# Patient Record
Sex: MALE | Race: WHITE | NOT HISPANIC OR LATINO | Employment: FULL TIME | URBAN - NONMETROPOLITAN AREA
[De-identification: names, ages, dates, MRNs, and addresses within clinical notes are randomized per-mention and may not be internally consistent; named-entity substitution may affect disease eponyms.]

---
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
## 2021-05-10 ENCOUNTER — HOSPITAL ENCOUNTER (EMERGENCY)
Facility: HOSPITAL | Age: 21
Discharge: HOME | End: 2021-05-11
Attending: EMERGENCY MEDICINE
Payer: COMMERCIAL

## 2021-05-10 DIAGNOSIS — K04.7 DENTAL ABSCESS: Primary | ICD-10-CM

## 2021-05-10 PROCEDURE — 99285 EMERGENCY DEPT VISIT HI MDM: CPT | Performed by: EMERGENCY MEDICINE

## 2021-05-10 PROCEDURE — 99282 EMERGENCY DEPT VISIT SF MDM: CPT

## 2021-05-10 RX ORDER — PENICILLIN V POTASSIUM 500 MG/1
500 TABLET ORAL 4 TIMES DAILY
Qty: 40 TABLET | Refills: 0 | Status: SHIPPED | OUTPATIENT
Start: 2021-05-10 | End: 2021-05-20

## 2021-05-10 RX ORDER — OXYCODONE HYDROCHLORIDE AND ACETAMINOPHEN 5; 325 MG/1; MG/1
1 TABLET ORAL ONCE
Status: COMPLETED | OUTPATIENT
Start: 2021-05-11 | End: 2021-05-10

## 2021-05-10 RX ORDER — ONDANSETRON 4 MG/1
4 TABLET, ORALLY DISINTEGRATING ORAL ONCE
Status: COMPLETED | OUTPATIENT
Start: 2021-05-11 | End: 2021-05-10

## 2021-05-10 RX ORDER — NAPROXEN 500 MG/1
500 TABLET ORAL ONCE
Status: COMPLETED | OUTPATIENT
Start: 2021-05-11 | End: 2021-05-10

## 2021-05-10 RX ORDER — NAPROXEN 500 MG/1
500 TABLET ORAL 2 TIMES DAILY WITH MEALS
Qty: 10 TABLET | Refills: 0 | Status: SHIPPED | OUTPATIENT
Start: 2021-05-10 | End: 2021-05-29 | Stop reason: HOSPADM

## 2021-05-10 RX ORDER — PENICILLIN V POTASSIUM 250 MG/1
500 TABLET ORAL ONCE
Status: COMPLETED | OUTPATIENT
Start: 2021-05-11 | End: 2021-05-10

## 2021-05-10 RX ADMIN — OXYCODONE HYDROCHLORIDE AND ACETAMINOPHEN 1 TABLET: 5; 325 TABLET ORAL at 23:58

## 2021-05-10 RX ADMIN — ONDANSETRON 4 MG: 4 TABLET, ORALLY DISINTEGRATING ORAL at 23:58

## 2021-05-10 RX ADMIN — NAPROXEN 500 MG: 500 TABLET ORAL at 23:57

## 2021-05-10 RX ADMIN — PENICILLIN V POTASSIUM 500 MG: 250 TABLET, FILM COATED ORAL at 23:58

## 2021-05-10 NOTE — Clinical Note
Media Pod was seen and treated in our emergency department on 5/10/2021  Diagnosis:     Hailey Montgomery  may return to work on return date  He may return on this date: 05/12/2021         If you have any questions or concerns, please don't hesitate to call        Evelyn Bridges DO    ______________________________           _______________          _______________  Hospital Representative                              Date                                Time

## 2021-05-11 VITALS
HEART RATE: 72 BPM | BODY MASS INDEX: 30.83 KG/M2 | WEIGHT: 220.24 LBS | TEMPERATURE: 98.8 F | RESPIRATION RATE: 18 BRPM | HEIGHT: 71 IN | SYSTOLIC BLOOD PRESSURE: 127 MMHG | OXYGEN SATURATION: 98 % | DIASTOLIC BLOOD PRESSURE: 66 MMHG

## 2021-05-11 NOTE — ED PROVIDER NOTES
History  Chief Complaint   Patient presents with    Dental Pain     started with dental pain friday, lower rigth teeth  Did have tylenol 2hrs ago with positive results  HPI     Pt presents from home, hx of poor dentition and is scheduled to have 8 teeth "pulled out," c/o right, lower 3rd molar pain and swelling to his mandible which began yesterday  Pt states that he took tylenol tonight with little relief  Pt denies ha, fevers, cough, cp, sob, n/v/d/c, abd pain, dysuria, focal def or syncope  Prior to Admission Medications   Prescriptions Last Dose Informant Patient Reported? Taking?   naproxen (NAPROSYN) 500 mg tablet   No No   Sig: Take 1 tablet (500 mg total) by mouth 2 (two) times a day with meals      Facility-Administered Medications: None       Past Medical History:   Diagnosis Date    Asthma        History reviewed  No pertinent surgical history  History reviewed  No pertinent family history  I have reviewed and agree with the history as documented  E-Cigarette/Vaping    E-Cigarette Use Never User      E-Cigarette/Vaping Substances    Nicotine No     THC No     CBD No     Flavoring No     Other No     Unknown No      Social History     Tobacco Use    Smoking status: Never Smoker    Smokeless tobacco: Never Used   Substance Use Topics    Alcohol use: Never     Frequency: Never    Drug use: Never       Review of Systems   Constitutional: Negative for activity change, appetite change and fever  HENT: Positive for dental problem and facial swelling  Negative for congestion, nosebleeds and sore throat  Eyes: Negative for photophobia and discharge  Respiratory: Negative for cough, shortness of breath, wheezing and stridor  Cardiovascular: Negative for chest pain  Gastrointestinal: Negative for abdominal pain, constipation, diarrhea, nausea and vomiting  Endocrine: Negative for cold intolerance and polydipsia     Genitourinary: Negative for discharge, hematuria and penile pain  Musculoskeletal: Negative for arthralgias, myalgias and neck stiffness  Skin: Negative for color change and rash  Allergic/Immunologic: Negative for immunocompromised state  Neurological: Negative for dizziness, seizures and headaches  Hematological: Negative for adenopathy  Psychiatric/Behavioral: Negative for confusion and self-injury  The patient is not nervous/anxious  Physical Exam  Physical Exam  Vitals signs and nursing note reviewed  Constitutional:       General: He is not in acute distress  Appearance: He is well-developed  He is not diaphoretic  HENT:      Head: Normocephalic and atraumatic  Right Ear: External ear normal       Left Ear: External ear normal       Nose: Nose normal       Mouth/Throat:      Pharynx: No oropharyngeal exudate  Eyes:      General: No scleral icterus  Right eye: No discharge  Left eye: No discharge  Conjunctiva/sclera: Conjunctivae normal       Pupils: Pupils are equal, round, and reactive to light  Neck:      Musculoskeletal: Normal range of motion and neck supple  Thyroid: No thyromegaly  Vascular: No JVD  Trachea: No tracheal deviation  Cardiovascular:      Rate and Rhythm: Normal rate and regular rhythm  Heart sounds: Normal heart sounds  No murmur  No friction rub  No gallop  Pulmonary:      Effort: No respiratory distress  Breath sounds: Normal breath sounds  No stridor  No wheezing or rales  Chest:      Chest wall: No tenderness  Abdominal:      General: Bowel sounds are normal  There is no distension  Palpations: Abdomen is soft  There is no mass  Tenderness: There is no abdominal tenderness  There is no guarding or rebound  Musculoskeletal: Normal range of motion  General: No tenderness or deformity  Lymphadenopathy:      Cervical: No cervical adenopathy  Skin:     General: Skin is warm and dry  Coloration: Skin is not pale  Findings: No erythema or rash  Neurological:      Mental Status: He is alert and oriented to person, place, and time  Cranial Nerves: No cranial nerve deficit  Motor: No abnormal muscle tone  Coordination: Coordination normal       Deep Tendon Reflexes: Reflexes are normal and symmetric  Reflexes normal    Psychiatric:         Behavior: Behavior normal          Thought Content: Thought content normal          Judgment: Judgment normal          Vital Signs  ED Triage Vitals [05/10/21 2324]   Temperature Pulse Respirations Blood Pressure SpO2   98 8 °F (37 1 °C) 77 18 137/73 97 %      Temp src Heart Rate Source Patient Position - Orthostatic VS BP Location FiO2 (%)   -- Monitor Sitting Right arm --      Pain Score       Worst Possible Pain           Vitals:    05/10/21 2324 05/10/21 2330   BP: 137/73 127/66   Pulse: 77 72   Patient Position - Orthostatic VS: Sitting Sitting         Visual Acuity      ED Medications  Medications   penicillin V potassium (VEETID) tablet 500 mg (500 mg Oral Given 5/10/21 2358)   naproxen (NAPROSYN) tablet 500 mg (500 mg Oral Given 5/10/21 2357)   ondansetron (ZOFRAN-ODT) dispersible tablet 4 mg (4 mg Oral Given 5/10/21 2358)   oxyCODONE-acetaminophen (PERCOCET) 5-325 mg per tablet 1 tablet (1 tablet Oral Given 5/10/21 2358)       Diagnostic Studies  Results Reviewed     None                 No orders to display              Procedures  Procedures         ED Course         OUSMANE      Most Recent Value   SBIRT (13-21 yo)   In order to provide better care to our patients, we are screening all of our patients for alcohol and drug use  Would it be okay to ask you these screening questions? Yes Filed at: 05/10/2021 2326   OUSMANE Initial Screen: During the past 12 months, did you:   1  Drink any alcohol (more than a few sips)? No Filed at: 05/10/2021 2326   2  Smoke any marijuana or hashish  No Filed at: 05/10/2021 2326   3   Use anything else to get high? ("anything else" includes illegal drugs, over the counter and prescription drugs, and things that you sniff or 'roy')? No Filed at: 05/10/2021 2326                                        MDM  Number of Diagnoses or Management Options  Dental abscess:   Diagnosis management comments: IMP: dental pain likely due to a dental abscess  Doubt osteomyelitis, bacteremia or deep space infection  Plan: give po abx and po narcotic pain medication prn  Pt will f/up w/ his dentist        Amount and/or Complexity of Data Reviewed  Tests in the medicine section of CPT®: ordered and reviewed  Decide to obtain previous medical records or to obtain history from someone other than the patient: yes  Review and summarize past medical records: yes  Independent visualization of images, tracings, or specimens: yes    Risk of Complications, Morbidity, and/or Mortality  Presenting problems: high  Diagnostic procedures: low  Management options: low    Patient Progress  Patient progress: improved      Disposition  Final diagnoses:   Dental abscess     Time reflects when diagnosis was documented in both MDM as applicable and the Disposition within this note     Time User Action Codes Description Comment    5/10/2021 11:50 PM Nevaeh Grewalus Add [K04 7] Dental abscess       ED Disposition     ED Disposition Condition Date/Time Comment    Discharge Stable Mon May 10, 2021 11:50 PM Estuardo Villegas discharge to home/self care              Follow-up Information     Follow up With Specialties Details Why [address] [facility] [place]  Schedule an appointment as soon as possible for a visit in 2 days Return immediately, If symptoms worsen [facility]          Discharge Medication List as of 5/10/2021 11:52 PM      START taking these medications    Details   !! naproxen (NAPROSYN) 500 mg tablet Take 1 tablet (500 mg total) by mouth 2 (two) times a day with meals for 10 doses, Starting Mon 5/10/2021, Until Sat 5/15/2021, Normal      penicillin V potassium (VEETID) 500 mg tablet Take 1 tablet (500 mg total) by mouth 4 (four) times a day for 10 days, Starting Mon 5/10/2021, Until Thu 5/20/2021, Normal       !! - Potential duplicate medications found  Please discuss with provider  CONTINUE these medications which have NOT CHANGED    Details   !! naproxen (NAPROSYN) 500 mg tablet Take 1 tablet (500 mg total) by mouth 2 (two) times a day with meals, Starting Sat 2/22/2020, Print       !! - Potential duplicate medications found  Please discuss with provider  No discharge procedures on file      PDMP Review     None          ED Provider  Electronically Signed by           Eron Grier DO  05/11/21 5967

## 2021-05-13 ENCOUNTER — HOSPITAL ENCOUNTER (EMERGENCY)
Facility: HOSPITAL | Age: 21
Discharge: HOME | End: 2021-05-14
Attending: EMERGENCY MEDICINE | Admitting: EMERGENCY MEDICINE
Payer: COMMERCIAL

## 2021-05-13 DIAGNOSIS — K04.7 DENTAL ABSCESS: Primary | ICD-10-CM

## 2021-05-13 LAB
ANION GAP SERPL CALCULATED.3IONS-SCNC: 10 MMOL/L (ref 4–13)
BASOPHILS # BLD AUTO: 0.02 THOUSANDS/ΜL (ref 0–0.1)
BASOPHILS NFR BLD AUTO: 0 % (ref 0–1)
BUN SERPL-MCNC: 13 MG/DL (ref 5–25)
CALCIUM SERPL-MCNC: 9.3 MG/DL (ref 8.3–10.1)
CHLORIDE SERPL-SCNC: 101 MMOL/L (ref 100–108)
CO2 SERPL-SCNC: 30 MMOL/L (ref 21–32)
CREAT SERPL-MCNC: 0.98 MG/DL (ref 0.6–1.3)
EOSINOPHIL # BLD AUTO: 0.09 THOUSAND/ΜL (ref 0–0.61)
EOSINOPHIL NFR BLD AUTO: 1 % (ref 0–6)
ERYTHROCYTE [DISTWIDTH] IN BLOOD BY AUTOMATED COUNT: 11.9 % (ref 11.6–15.1)
GFR SERPL CREATININE-BSD FRML MDRD: 111 ML/MIN/1.73SQ M
GLUCOSE SERPL-MCNC: 98 MG/DL (ref 65–140)
HCT VFR BLD AUTO: 40.9 % (ref 36.5–49.3)
HGB BLD-MCNC: 14.3 G/DL (ref 12–17)
IMM GRANULOCYTES # BLD AUTO: 0.03 THOUSAND/UL (ref 0–0.2)
IMM GRANULOCYTES NFR BLD AUTO: 0 % (ref 0–2)
LYMPHOCYTES # BLD AUTO: 1.93 THOUSANDS/ΜL (ref 0.6–4.47)
LYMPHOCYTES NFR BLD AUTO: 17 % (ref 14–44)
MCH RBC QN AUTO: 29.5 PG (ref 26.8–34.3)
MCHC RBC AUTO-ENTMCNC: 35 G/DL (ref 31.4–37.4)
MCV RBC AUTO: 85 FL (ref 82–98)
MONOCYTES # BLD AUTO: 0.91 THOUSAND/ΜL (ref 0.17–1.22)
MONOCYTES NFR BLD AUTO: 8 % (ref 4–12)
NEUTROPHILS # BLD AUTO: 8.41 THOUSANDS/ΜL (ref 1.85–7.62)
NEUTS SEG NFR BLD AUTO: 74 % (ref 43–75)
NRBC BLD AUTO-RTO: 0 /100 WBCS
PLATELET # BLD AUTO: 297 THOUSANDS/UL (ref 149–390)
PMV BLD AUTO: 9.4 FL (ref 8.9–12.7)
POTASSIUM SERPL-SCNC: 4.1 MMOL/L (ref 3.5–5.3)
RBC # BLD AUTO: 4.84 MILLION/UL (ref 3.88–5.62)
SODIUM SERPL-SCNC: 141 MMOL/L (ref 136–145)
WBC # BLD AUTO: 11.39 THOUSAND/UL (ref 4.31–10.16)

## 2021-05-13 PROCEDURE — 80048 BASIC METABOLIC PNL TOTAL CA: CPT | Performed by: EMERGENCY MEDICINE

## 2021-05-13 PROCEDURE — 36415 COLL VENOUS BLD VENIPUNCTURE: CPT | Performed by: EMERGENCY MEDICINE

## 2021-05-13 PROCEDURE — 99283 EMERGENCY DEPT VISIT LOW MDM: CPT

## 2021-05-13 PROCEDURE — 85025 COMPLETE CBC W/AUTO DIFF WBC: CPT | Performed by: EMERGENCY MEDICINE

## 2021-05-13 PROCEDURE — 99284 EMERGENCY DEPT VISIT MOD MDM: CPT | Performed by: EMERGENCY MEDICINE

## 2021-05-13 RX ORDER — ACETAMINOPHEN 325 MG/1
975 TABLET ORAL ONCE
Status: COMPLETED | OUTPATIENT
Start: 2021-05-13 | End: 2021-05-13

## 2021-05-13 RX ADMIN — ACETAMINOPHEN 975 MG: 325 TABLET, FILM COATED ORAL at 23:26

## 2021-05-14 ENCOUNTER — APPOINTMENT (EMERGENCY)
Dept: CT IMAGING | Facility: HOSPITAL | Age: 21
End: 2021-05-14
Payer: COMMERCIAL

## 2021-05-14 VITALS
BODY MASS INDEX: 30.72 KG/M2 | SYSTOLIC BLOOD PRESSURE: 122 MMHG | HEART RATE: 73 BPM | WEIGHT: 220.24 LBS | TEMPERATURE: 98.8 F | RESPIRATION RATE: 17 BRPM | OXYGEN SATURATION: 97 % | DIASTOLIC BLOOD PRESSURE: 72 MMHG

## 2021-05-14 PROCEDURE — 70491 CT SOFT TISSUE NECK W/DYE: CPT

## 2021-05-14 RX ADMIN — IOHEXOL 85 ML: 350 INJECTION, SOLUTION INTRAVENOUS at 01:10

## 2021-05-14 NOTE — DISCHARGE INSTRUCTIONS
You had a referral placed for the oral surgeon  It is important that you follow up with one of the dentists provided to you for the following CT read:     Suspect 8mm odontogenic abscess along the right mandible at the level the 1st molar tooth  It is important that you continue to take the penicillin as prescribed as failing to do so could lead to worsening infection    Return to the ED if he develops worsening facial swelling, high fevers, difficulty swallowing, breathing or any other concerning symptoms

## 2021-05-14 NOTE — ED PROVIDER NOTES
History  Chief Complaint   Patient presents with    Dental Pain     LOWER BACK TOOTH, RIGHT     Patient is a 80-year-old male who presents for evaluation of right lower dental pain  Patient was seen on 05/10 for similar symptoms  He is given a script for penicillin and naproxen which he says he has been taking  He says that the pain has become unbearable    He says that he feels like he is having more facial swelling  He denies any fevers or chills  He denies any difficulty swallowing and has been able to handle his secretions  He says that he was unable to make an appointment for the dentist and requires a referral   The patient has notable trismus and is not able to fully open his mouth on exam   There is no obvious signs of dental abscess  His vitals are within normal limits          Prior to Admission Medications   Prescriptions Last Dose Informant Patient Reported? Taking?   naproxen (NAPROSYN) 500 mg tablet   No No   Sig: Take 1 tablet (500 mg total) by mouth 2 (two) times a day with meals   naproxen (NAPROSYN) 500 mg tablet   No No   Sig: Take 1 tablet (500 mg total) by mouth 2 (two) times a day with meals for 10 doses   penicillin V potassium (VEETID) 500 mg tablet   No No   Sig: Take 1 tablet (500 mg total) by mouth 4 (four) times a day for 10 days      Facility-Administered Medications: None       Past Medical History:   Diagnosis Date    Asthma        History reviewed  No pertinent surgical history  History reviewed  No pertinent family history  I have reviewed and agree with the history as documented      E-Cigarette/Vaping    E-Cigarette Use Never User      E-Cigarette/Vaping Substances    Nicotine No     THC No     CBD No     Flavoring No     Other No     Unknown No      Social History     Tobacco Use    Smoking status: Never Smoker    Smokeless tobacco: Never Used   Substance Use Topics    Alcohol use: Never     Frequency: Never    Drug use: Never       Review of Systems Constitutional: Negative for chills, fever and unexpected weight change  HENT: Positive for dental problem  Negative for congestion, sore throat and trouble swallowing  Eyes: Negative for pain, discharge and itching  Respiratory: Negative for cough, chest tightness, shortness of breath and wheezing  Cardiovascular: Negative for chest pain, palpitations and leg swelling  Gastrointestinal: Negative for abdominal pain, blood in stool, diarrhea, nausea and vomiting  Endocrine: Negative for polyuria  Genitourinary: Negative for difficulty urinating, dysuria, frequency and hematuria  Musculoskeletal: Negative for arthralgias and back pain  Neurological: Negative for dizziness, syncope, weakness, light-headedness and headaches  Physical Exam  Physical Exam  Vitals signs and nursing note reviewed  Constitutional:       General: He is not in acute distress  Appearance: He is well-developed  HENT:      Head: Normocephalic and atraumatic  Right Ear: External ear normal       Left Ear: External ear normal       Mouth/Throat:      Mouth: Mucous membranes are moist       Dentition: Abnormal dentition  Dental tenderness and dental caries present  No dental abscesses or gum lesions  Pharynx: No oropharyngeal exudate  Comments: Unable to fully open mouth   Eyes:      Conjunctiva/sclera: Conjunctivae normal       Pupils: Pupils are equal, round, and reactive to light  Neck:      Musculoskeletal: Normal range of motion  Cardiovascular:      Rate and Rhythm: Normal rate and regular rhythm  Heart sounds: Normal heart sounds  No murmur  No friction rub  No gallop  Pulmonary:      Effort: Pulmonary effort is normal  No respiratory distress  Breath sounds: Normal breath sounds  No wheezing or rales  Abdominal:      General: Bowel sounds are normal  There is no distension  Palpations: Abdomen is soft  Tenderness: There is no abdominal tenderness   There is no guarding  Musculoskeletal: Normal range of motion  General: No tenderness or deformity  Lymphadenopathy:      Cervical: No cervical adenopathy  Skin:     General: Skin is warm and dry  Neurological:      General: No focal deficit present  Mental Status: He is alert and oriented to person, place, and time  Mental status is at baseline  Cranial Nerves: No cranial nerve deficit  Sensory: No sensory deficit  Motor: No weakness or abnormal muscle tone     Psychiatric:         Behavior: Behavior normal          Vital Signs  ED Triage Vitals   Temperature Pulse Respirations Blood Pressure SpO2   05/13/21 2300 05/13/21 2252 05/13/21 2252 05/13/21 2252 05/13/21 2252   98 8 °F (37 1 °C) 78 18 151/79 100 %      Temp src Heart Rate Source Patient Position - Orthostatic VS BP Location FiO2 (%)   -- 05/13/21 2252 05/13/21 2252 05/13/21 2252 --    Monitor Lying Left arm       Pain Score       05/13/21 2252       Worst Possible Pain           Vitals:    05/13/21 2252 05/13/21 2345 05/14/21 0138 05/14/21 0200   BP: 151/79 121/77 119/72 131/65   Pulse: 78 76 73 70   Patient Position - Orthostatic VS: Lying Lying Lying Lying         Visual Acuity      ED Medications  Medications   acetaminophen (TYLENOL) tablet 975 mg (975 mg Oral Given 5/13/21 2326)   iohexol (OMNIPAQUE) 350 MG/ML injection (SINGLE-DOSE) 85 mL (85 mL Intravenous Given 5/14/21 0110)       Diagnostic Studies  Results Reviewed     Procedure Component Value Units Date/Time    Basic metabolic panel [[id number]] Collected: 05/13/21 2324    Lab Status: Final result Specimen: Blood from Arm, Right Updated: 05/13/21 2344     Sodium 141 mmol/L      Potassium 4 1 mmol/L      Chloride 101 mmol/L      CO2 30 mmol/L      ANION GAP 10 mmol/L      BUN 13 mg/dL      Creatinine 0 98 mg/dL      Glucose 98 mg/dL      Calcium 9 3 mg/dL      eGFR 111 ml/min/1 73sq m     Narrative:      [place] guidelines for Chronic Kidney Disease (CKD):     Stage 1 with normal or high GFR (GFR > 90 mL/min/1 73 square meters)    Stage 2 Mild CKD (GFR = 60-89 mL/min/1 73 square meters)    Stage 3A Moderate CKD (GFR = 45-59 mL/min/1 73 square meters)    Stage 3B Moderate CKD (GFR = 30-44 mL/min/1 73 square meters)    Stage 4 Severe CKD (GFR = 15-29 mL/min/1 73 square meters)    Stage 5 End Stage CKD (GFR <15 mL/min/1 73 square meters)  Note: GFR calculation is accurate only with a steady state creatinine    CBC and differential [[id number]]  (Abnormal) Collected: 05/13/21 2324    Lab Status: Final result Specimen: Blood from Arm, Right Updated: 05/13/21 2336     WBC 11 39 Thousand/uL      RBC 4 84 Million/uL      Hemoglobin 14 3 g/dL      Hematocrit 40 9 %      MCV 85 fL      MCH 29 5 pg      MCHC 35 0 g/dL      RDW 11 9 %      MPV 9 4 fL      Platelets 362 Thousands/uL      nRBC 0 /100 WBCs      Neutrophils Relative 74 %      Immat GRANS % 0 %      Lymphocytes Relative 17 %      Monocytes Relative 8 %      Eosinophils Relative 1 %      Basophils Relative 0 %      Neutrophils Absolute 8 41 Thousands/µL      Immature Grans Absolute 0 03 Thousand/uL      Lymphocytes Absolute 1 93 Thousands/µL      Monocytes Absolute 0 91 Thousand/µL      Eosinophils Absolute 0 09 Thousand/µL      Basophils Absolute 0 02 Thousands/µL     UA (URINE) with reflex to Scope [[id number]]     Lab Status: No result Specimen: Urine                  CT soft tissue neck with contrast   Final Result by Zenovia Dakins, MD (05/14 0300)      Suspect 8mm odontogenic abscess along the right mandible at the level the 1st molar tooth  Study was marked significant in Epic for notification  Workstation performed: [id number]                    Procedures  Procedures         ED Course  ED Course as of May 14 0316   Fri May 14, 2021   0228 [facility] radiology about CT for patient, still waiting on read   It has been over 2 hours since study completion MDM  Number of Diagnoses or Management Options  Dental abscess:   Diagnosis management comments: 81-year-old male presenting for right lower dental pain  Has been progressing since being seen on 05/10  Has been on penicillin taking naproxen  Denies fevers or chills, has been able tolerate secretions  Does have some trismus on exam   Vitals within normal limits  Given trismus, will obtain CT soft tissue neck to evaluate for deeper infection  Will obtain basic labs  CT shows small dental abscess  Labs within normal limits  Vitals within normal limits  Patient told to continue take the penicillin as prescribed  Given ambulatory referral for OMS in given list of dental clinics  Disposition  Final diagnoses:   Dental abscess     Time reflects when diagnosis was documented in both MDM as applicable and the Disposition within this note     Time User Action Codes Description Comment    5/14/2021  3:08 AM Ade Aviles [K04 7] Dental abscess       ED Disposition     ED Disposition Condition Date/Time Comment    Discharge Stable Fri May 14, 2021  3:08 AM Cloteal Ogles discharge to home/self care              Follow-up Information    None         Patient's Medications   Discharge Prescriptions    No medications on file         PDMP Review     None          ED Provider  Electronically Signed by           Del Sterling DO  05/14/21 0021

## 2021-05-22 ENCOUNTER — HOSPITAL ENCOUNTER (EMERGENCY)
Facility: HOSPITAL | Age: 21
End: 2021-05-23
Attending: EMERGENCY MEDICINE
Payer: COMMERCIAL

## 2021-05-22 ENCOUNTER — APPOINTMENT (EMERGENCY)
Dept: CT IMAGING | Facility: HOSPITAL | Age: 21
End: 2021-05-22
Payer: COMMERCIAL

## 2021-05-22 DIAGNOSIS — K04.7 DENTAL ABSCESS: Primary | ICD-10-CM

## 2021-05-22 LAB
ANION GAP SERPL CALCULATED.3IONS-SCNC: 10 MMOL/L (ref 4–13)
BASOPHILS # BLD AUTO: 0.03 THOUSANDS/ΜL (ref 0–0.1)
BASOPHILS NFR BLD AUTO: 0 % (ref 0–1)
BUN SERPL-MCNC: 13 MG/DL (ref 5–25)
CALCIUM SERPL-MCNC: 9.3 MG/DL (ref 8.3–10.1)
CHLORIDE SERPL-SCNC: 102 MMOL/L (ref 100–108)
CO2 SERPL-SCNC: 28 MMOL/L (ref 21–32)
CREAT SERPL-MCNC: 1.12 MG/DL (ref 0.6–1.3)
EOSINOPHIL # BLD AUTO: 0.14 THOUSAND/ΜL (ref 0–0.61)
EOSINOPHIL NFR BLD AUTO: 1 % (ref 0–6)
ERYTHROCYTE [DISTWIDTH] IN BLOOD BY AUTOMATED COUNT: 11.8 % (ref 11.6–15.1)
GFR SERPL CREATININE-BSD FRML MDRD: 95 ML/MIN/1.73SQ M
GLUCOSE SERPL-MCNC: 91 MG/DL (ref 65–140)
HCT VFR BLD AUTO: 40.1 % (ref 36.5–49.3)
HGB BLD-MCNC: 14.2 G/DL (ref 12–17)
IMM GRANULOCYTES # BLD AUTO: 0.05 THOUSAND/UL (ref 0–0.2)
IMM GRANULOCYTES NFR BLD AUTO: 0 % (ref 0–2)
LYMPHOCYTES # BLD AUTO: 1.84 THOUSANDS/ΜL (ref 0.6–4.47)
LYMPHOCYTES NFR BLD AUTO: 12 % (ref 14–44)
MCH RBC QN AUTO: 30.3 PG (ref 26.8–34.3)
MCHC RBC AUTO-ENTMCNC: 35.4 G/DL (ref 31.4–37.4)
MCV RBC AUTO: 86 FL (ref 82–98)
MONOCYTES # BLD AUTO: 0.86 THOUSAND/ΜL (ref 0.17–1.22)
MONOCYTES NFR BLD AUTO: 5 % (ref 4–12)
NEUTROPHILS # BLD AUTO: 13.11 THOUSANDS/ΜL (ref 1.85–7.62)
NEUTS SEG NFR BLD AUTO: 82 % (ref 43–75)
NRBC BLD AUTO-RTO: 0 /100 WBCS
PLATELET # BLD AUTO: 433 THOUSANDS/UL (ref 149–390)
PMV BLD AUTO: 8.9 FL (ref 8.9–12.7)
POTASSIUM SERPL-SCNC: 3.8 MMOL/L (ref 3.5–5.3)
RBC # BLD AUTO: 4.69 MILLION/UL (ref 3.88–5.62)
SODIUM SERPL-SCNC: 140 MMOL/L (ref 136–145)
WBC # BLD AUTO: 16.03 THOUSAND/UL (ref 4.31–10.16)

## 2021-05-22 PROCEDURE — 36415 COLL VENOUS BLD VENIPUNCTURE: CPT | Performed by: EMERGENCY MEDICINE

## 2021-05-22 PROCEDURE — 85025 COMPLETE CBC W/AUTO DIFF WBC: CPT | Performed by: EMERGENCY MEDICINE

## 2021-05-22 PROCEDURE — 99285 EMERGENCY DEPT VISIT HI MDM: CPT

## 2021-05-22 PROCEDURE — 80048 BASIC METABOLIC PNL TOTAL CA: CPT | Performed by: EMERGENCY MEDICINE

## 2021-05-22 PROCEDURE — 70491 CT SOFT TISSUE NECK W/DYE: CPT

## 2021-05-22 RX ORDER — PENICILLIN V POTASSIUM 500 MG/1
500 TABLET ORAL EVERY 6 HOURS SCHEDULED
COMMUNITY
End: 2021-05-29 | Stop reason: HOSPADM

## 2021-05-22 RX ADMIN — IOHEXOL 85 ML: 350 INJECTION, SOLUTION INTRAVENOUS at 23:15

## 2021-05-23 ENCOUNTER — APPOINTMENT (INPATIENT)
Dept: RADIOLOGY | Facility: HOSPITAL | Age: 21
DRG: 098 | End: 2021-05-23
Payer: COMMERCIAL

## 2021-05-23 ENCOUNTER — ANESTHESIA (INPATIENT)
Dept: PERIOP | Facility: HOSPITAL | Age: 21
DRG: 098 | End: 2021-05-23
Payer: COMMERCIAL

## 2021-05-23 ENCOUNTER — ANESTHESIA EVENT (INPATIENT)
Dept: PERIOP | Facility: HOSPITAL | Age: 21
DRG: 098 | End: 2021-05-23
Payer: COMMERCIAL

## 2021-05-23 ENCOUNTER — HOSPITAL ENCOUNTER (INPATIENT)
Facility: HOSPITAL | Age: 21
LOS: 6 days | Discharge: HOME | DRG: 098 | End: 2021-05-29
Attending: INTERNAL MEDICINE | Admitting: SURGERY
Payer: COMMERCIAL

## 2021-05-23 VITALS
HEIGHT: 71 IN | WEIGHT: 205 LBS | HEART RATE: 72 BPM | OXYGEN SATURATION: 97 % | SYSTOLIC BLOOD PRESSURE: 107 MMHG | TEMPERATURE: 98.8 F | DIASTOLIC BLOOD PRESSURE: 57 MMHG | BODY MASS INDEX: 28.7 KG/M2 | RESPIRATION RATE: 18 BRPM

## 2021-05-23 DIAGNOSIS — K04.7 DENTAL ABSCESS: Primary | ICD-10-CM

## 2021-05-23 DIAGNOSIS — J98.8 AIRWAY COMPROMISE: ICD-10-CM

## 2021-05-23 DIAGNOSIS — N48.30 PRIAPISM: ICD-10-CM

## 2021-05-23 PROBLEM — D72.829 LEUKOCYTOSIS: Status: ACTIVE | Noted: 2021-05-23

## 2021-05-23 LAB
ANION GAP SERPL CALCULATED.3IONS-SCNC: 11 MMOL/L (ref 4–13)
BASE EXCESS BLDA CALC-SCNC: -1 MMOL/L (ref -2–3)
BASOPHILS # BLD AUTO: 0.04 THOUSANDS/ΜL (ref 0–0.1)
BASOPHILS NFR BLD AUTO: 0 % (ref 0–1)
BUN SERPL-MCNC: 10 MG/DL (ref 5–25)
CALCIUM SERPL-MCNC: 8.6 MG/DL (ref 8.3–10.1)
CHLORIDE SERPL-SCNC: 105 MMOL/L (ref 100–108)
CO2 SERPL-SCNC: 24 MMOL/L (ref 21–32)
CREAT SERPL-MCNC: 0.85 MG/DL (ref 0.6–1.3)
EOSINOPHIL # BLD AUTO: 0 THOUSAND/ΜL (ref 0–0.61)
EOSINOPHIL NFR BLD AUTO: 0 % (ref 0–6)
ERYTHROCYTE [DISTWIDTH] IN BLOOD BY AUTOMATED COUNT: 11.9 % (ref 11.6–15.1)
FIO2 GAS DIL.REBREATH: 40 L
GFR SERPL CREATININE-BSD FRML MDRD: 126 ML/MIN/1.73SQ M
GLUCOSE SERPL-MCNC: 109 MG/DL (ref 65–140)
GLUCOSE SERPL-MCNC: 110 MG/DL (ref 65–140)
GLUCOSE SERPL-MCNC: 115 MG/DL (ref 65–140)
GLUCOSE SERPL-MCNC: 65 MG/DL (ref 65–140)
HCO3 BLDA-SCNC: 22.8 MMOL/L (ref 22–28)
HCT VFR BLD AUTO: 40 % (ref 36.5–49.3)
HCT VFR BLD CALC: 36 % (ref 36.5–49.3)
HGB BLD-MCNC: 13.5 G/DL (ref 12–17)
HGB BLDA-MCNC: 12.2 G/DL (ref 12–17)
IMM GRANULOCYTES # BLD AUTO: 0.16 THOUSAND/UL (ref 0–0.2)
IMM GRANULOCYTES NFR BLD AUTO: 1 % (ref 0–2)
LYMPHOCYTES # BLD AUTO: 0.4 THOUSANDS/ΜL (ref 0.6–4.47)
LYMPHOCYTES NFR BLD AUTO: 2 % (ref 14–44)
MCH RBC QN AUTO: 29 PG (ref 26.8–34.3)
MCHC RBC AUTO-ENTMCNC: 33.8 G/DL (ref 31.4–37.4)
MCV RBC AUTO: 86 FL (ref 82–98)
MONOCYTES # BLD AUTO: 0.83 THOUSAND/ΜL (ref 0.17–1.22)
MONOCYTES NFR BLD AUTO: 4 % (ref 4–12)
NEUTROPHILS # BLD AUTO: 22.22 THOUSANDS/ΜL (ref 1.85–7.62)
NEUTS SEG NFR BLD AUTO: 93 % (ref 43–75)
NRBC BLD AUTO-RTO: 0 /100 WBCS
PCO2 BLD: 24 MMOL/L (ref 21–32)
PCO2 BLD: 34.6 MM HG (ref 36–44)
PH BLD: 7.43 [PH] (ref 7.35–7.45)
PLATELET # BLD AUTO: 396 THOUSANDS/UL (ref 149–390)
PMV BLD AUTO: 9.2 FL (ref 8.9–12.7)
PO2 BLD: 205 MM HG (ref 75–129)
POTASSIUM BLD-SCNC: 4 MMOL/L (ref 3.5–5.3)
POTASSIUM SERPL-SCNC: 4.5 MMOL/L (ref 3.5–5.3)
RBC # BLD AUTO: 4.65 MILLION/UL (ref 3.88–5.62)
SAO2 % BLD FROM PO2: 100 % (ref 60–85)
SODIUM BLD-SCNC: 139 MMOL/L (ref 136–145)
SODIUM SERPL-SCNC: 140 MMOL/L (ref 136–145)
SPECIMEN SOURCE: ABNORMAL
WBC # BLD AUTO: 23.65 THOUSAND/UL (ref 4.31–10.16)

## 2021-05-23 PROCEDURE — 84132 ASSAY OF SERUM POTASSIUM: CPT

## 2021-05-23 PROCEDURE — 82803 BLOOD GASES ANY COMBINATION: CPT

## 2021-05-23 PROCEDURE — 96375 TX/PRO/DX INJ NEW DRUG ADDON: CPT

## 2021-05-23 PROCEDURE — 87205 SMEAR GRAM STAIN: CPT | Performed by: DENTIST

## 2021-05-23 PROCEDURE — 85014 HEMATOCRIT: CPT

## 2021-05-23 PROCEDURE — 99291 CRITICAL CARE FIRST HOUR: CPT | Performed by: SURGERY

## 2021-05-23 PROCEDURE — 84295 ASSAY OF SERUM SODIUM: CPT

## 2021-05-23 PROCEDURE — 0J910ZZ DRAINAGE OF FACE SUBCUTANEOUS TISSUE AND FASCIA, OPEN APPROACH: ICD-10-PCS | Performed by: DENTIST

## 2021-05-23 PROCEDURE — 36600 WITHDRAWAL OF ARTERIAL BLOOD: CPT

## 2021-05-23 PROCEDURE — 94760 N-INVAS EAR/PLS OXIMETRY 1: CPT

## 2021-05-23 PROCEDURE — 85025 COMPLETE CBC W/AUTO DIFF WBC: CPT | Performed by: PHYSICIAN ASSISTANT

## 2021-05-23 PROCEDURE — 0W930ZZ DRAINAGE OF ORAL CAVITY AND THROAT, OPEN APPROACH: ICD-10-PCS | Performed by: DENTIST

## 2021-05-23 PROCEDURE — 94002 VENT MGMT INPAT INIT DAY: CPT

## 2021-05-23 PROCEDURE — 94150 VITAL CAPACITY TEST: CPT

## 2021-05-23 PROCEDURE — 87075 CULTR BACTERIA EXCEPT BLOOD: CPT | Performed by: DENTIST

## 2021-05-23 PROCEDURE — 71045 X-RAY EXAM CHEST 1 VIEW: CPT

## 2021-05-23 PROCEDURE — 96376 TX/PRO/DX INJ SAME DRUG ADON: CPT

## 2021-05-23 PROCEDURE — 87070 CULTURE OTHR SPECIMN AEROBIC: CPT | Performed by: DENTIST

## 2021-05-23 PROCEDURE — 96365 THER/PROPH/DIAG IV INF INIT: CPT

## 2021-05-23 PROCEDURE — 99222 1ST HOSP IP/OBS MODERATE 55: CPT | Performed by: PHYSICIAN ASSISTANT

## 2021-05-23 PROCEDURE — 84145 PROCALCITONIN (PCT): CPT | Performed by: PHYSICIAN ASSISTANT

## 2021-05-23 PROCEDURE — 80048 BASIC METABOLIC PNL TOTAL CA: CPT | Performed by: PHYSICIAN ASSISTANT

## 2021-05-23 PROCEDURE — 36415 COLL VENOUS BLD VENIPUNCTURE: CPT | Performed by: EMERGENCY MEDICINE

## 2021-05-23 PROCEDURE — 99285 EMERGENCY DEPT VISIT HI MDM: CPT | Performed by: EMERGENCY MEDICINE

## 2021-05-23 PROCEDURE — 87147 CULTURE TYPE IMMUNOLOGIC: CPT | Performed by: DENTIST

## 2021-05-23 PROCEDURE — 87040 BLOOD CULTURE FOR BACTERIA: CPT | Performed by: EMERGENCY MEDICINE

## 2021-05-23 PROCEDURE — 96366 THER/PROPH/DIAG IV INF ADDON: CPT

## 2021-05-23 PROCEDURE — 0CTX0Z1 RESECTION OF LOWER TOOTH, MULTIPLE, OPEN APPROACH: ICD-10-PCS | Performed by: DENTIST

## 2021-05-23 PROCEDURE — 82947 ASSAY GLUCOSE BLOOD QUANT: CPT

## 2021-05-23 PROCEDURE — 82948 REAGENT STRIP/BLOOD GLUCOSE: CPT

## 2021-05-23 RX ORDER — ONDANSETRON 2 MG/ML
4 INJECTION INTRAMUSCULAR; INTRAVENOUS EVERY 6 HOURS PRN
Status: DISCONTINUED | OUTPATIENT
Start: 2021-05-23 | End: 2021-05-29 | Stop reason: HOSPADM

## 2021-05-23 RX ORDER — HYDROMORPHONE HCL/PF 1 MG/ML
0.5 SYRINGE (ML) INJECTION EVERY 4 HOURS PRN
Status: DISCONTINUED | OUTPATIENT
Start: 2021-05-23 | End: 2021-05-23

## 2021-05-23 RX ORDER — KETOROLAC TROMETHAMINE 30 MG/ML
15 INJECTION, SOLUTION INTRAMUSCULAR; INTRAVENOUS EVERY 6 HOURS PRN
Status: DISCONTINUED | OUTPATIENT
Start: 2021-05-23 | End: 2021-05-23

## 2021-05-23 RX ORDER — FENTANYL CITRATE 50 UG/ML
50 INJECTION, SOLUTION INTRAMUSCULAR; INTRAVENOUS ONCE
Status: COMPLETED | OUTPATIENT
Start: 2021-05-23 | End: 2021-05-23

## 2021-05-23 RX ORDER — HYDROMORPHONE HCL/PF 1 MG/ML
1 SYRINGE (ML) INJECTION EVERY 4 HOURS PRN
Status: DISCONTINUED | OUTPATIENT
Start: 2021-05-23 | End: 2021-05-23

## 2021-05-23 RX ORDER — BUPIVACAINE HYDROCHLORIDE 2.5 MG/ML
INJECTION, SOLUTION EPIDURAL; INFILTRATION; INTRACAUDAL AS NEEDED
Status: DISCONTINUED | OUTPATIENT
Start: 2021-05-23 | End: 2021-05-23 | Stop reason: HOSPADM

## 2021-05-23 RX ORDER — SODIUM CHLORIDE 9 MG/ML
125 INJECTION, SOLUTION INTRAVENOUS CONTINUOUS
Status: DISCONTINUED | OUTPATIENT
Start: 2021-05-23 | End: 2021-05-23

## 2021-05-23 RX ORDER — LIDOCAINE HYDROCHLORIDE AND EPINEPHRINE 10; 10 MG/ML; UG/ML
INJECTION, SOLUTION INFILTRATION; PERINEURAL AS NEEDED
Status: DISCONTINUED | OUTPATIENT
Start: 2021-05-23 | End: 2021-05-23 | Stop reason: HOSPADM

## 2021-05-23 RX ORDER — ONDANSETRON 2 MG/ML
INJECTION INTRAMUSCULAR; INTRAVENOUS AS NEEDED
Status: DISCONTINUED | OUTPATIENT
Start: 2021-05-23 | End: 2021-05-23

## 2021-05-23 RX ORDER — DEXMEDETOMIDINE HYDROCHLORIDE 100 UG/ML
INJECTION, SOLUTION INTRAVENOUS AS NEEDED
Status: DISCONTINUED | OUTPATIENT
Start: 2021-05-23 | End: 2021-05-23

## 2021-05-23 RX ORDER — DEXTROSE MONOHYDRATE 25 G/50ML
25 INJECTION, SOLUTION INTRAVENOUS ONCE
Status: COMPLETED | OUTPATIENT
Start: 2021-05-23 | End: 2021-05-23

## 2021-05-23 RX ORDER — ROCURONIUM BROMIDE 10 MG/ML
INJECTION, SOLUTION INTRAVENOUS AS NEEDED
Status: DISCONTINUED | OUTPATIENT
Start: 2021-05-23 | End: 2021-05-23

## 2021-05-23 RX ORDER — SODIUM CHLORIDE, SODIUM GLUCONATE, SODIUM ACETATE, POTASSIUM CHLORIDE, MAGNESIUM CHLORIDE, SODIUM PHOSPHATE, DIBASIC, AND POTASSIUM PHOSPHATE .53; .5; .37; .037; .03; .012; .00082 G/100ML; G/100ML; G/100ML; G/100ML; G/100ML; G/100ML; G/100ML
75 INJECTION, SOLUTION INTRAVENOUS CONTINUOUS
Status: DISCONTINUED | OUTPATIENT
Start: 2021-05-23 | End: 2021-05-28

## 2021-05-23 RX ORDER — DEXTROSE AND SODIUM CHLORIDE 5; .9 G/100ML; G/100ML
100 INJECTION, SOLUTION INTRAVENOUS CONTINUOUS
Status: DISCONTINUED | OUTPATIENT
Start: 2021-05-23 | End: 2021-05-23

## 2021-05-23 RX ORDER — PROPOFOL 10 MG/ML
5-50 INJECTION, EMULSION INTRAVENOUS
Status: DISCONTINUED | OUTPATIENT
Start: 2021-05-23 | End: 2021-05-25

## 2021-05-23 RX ORDER — PROPOFOL 10 MG/ML
INJECTION, EMULSION INTRAVENOUS AS NEEDED
Status: DISCONTINUED | OUTPATIENT
Start: 2021-05-23 | End: 2021-05-23

## 2021-05-23 RX ORDER — KETOROLAC TROMETHAMINE 30 MG/ML
15 INJECTION, SOLUTION INTRAMUSCULAR; INTRAVENOUS ONCE
Status: COMPLETED | OUTPATIENT
Start: 2021-05-23 | End: 2021-05-23

## 2021-05-23 RX ORDER — GLYCOPYRROLATE 0.2 MG/ML
INJECTION INTRAMUSCULAR; INTRAVENOUS AS NEEDED
Status: DISCONTINUED | OUTPATIENT
Start: 2021-05-23 | End: 2021-05-23

## 2021-05-23 RX ORDER — DEXAMETHASONE SODIUM PHOSPHATE 10 MG/ML
INJECTION, SOLUTION INTRAMUSCULAR; INTRAVENOUS AS NEEDED
Status: DISCONTINUED | OUTPATIENT
Start: 2021-05-23 | End: 2021-05-23

## 2021-05-23 RX ORDER — OXYCODONE HYDROCHLORIDE 5 MG/1
5 TABLET ORAL EVERY 4 HOURS PRN
Status: DISCONTINUED | OUTPATIENT
Start: 2021-05-23 | End: 2021-05-23

## 2021-05-23 RX ORDER — SODIUM CHLORIDE, SODIUM LACTATE, POTASSIUM CHLORIDE, CALCIUM CHLORIDE 600; 310; 30; 20 MG/100ML; MG/100ML; MG/100ML; MG/100ML
INJECTION, SOLUTION INTRAVENOUS CONTINUOUS PRN
Status: DISCONTINUED | OUTPATIENT
Start: 2021-05-23 | End: 2021-05-23

## 2021-05-23 RX ORDER — OXYCODONE HYDROCHLORIDE 10 MG/1
10 TABLET ORAL EVERY 4 HOURS PRN
Status: DISCONTINUED | OUTPATIENT
Start: 2021-05-23 | End: 2021-05-23

## 2021-05-23 RX ORDER — FENTANYL CITRATE 50 UG/ML
50 INJECTION, SOLUTION INTRAMUSCULAR; INTRAVENOUS
Status: DISCONTINUED | OUTPATIENT
Start: 2021-05-23 | End: 2021-05-24

## 2021-05-23 RX ORDER — ACETAMINOPHEN 325 MG/1
650 TABLET ORAL EVERY 6 HOURS PRN
Status: DISCONTINUED | OUTPATIENT
Start: 2021-05-23 | End: 2021-05-25

## 2021-05-23 RX ORDER — CHLORHEXIDINE GLUCONATE 0.12 MG/ML
15 RINSE ORAL EVERY 12 HOURS SCHEDULED
Status: DISCONTINUED | OUTPATIENT
Start: 2021-05-23 | End: 2021-05-26

## 2021-05-23 RX ORDER — MIDAZOLAM HYDROCHLORIDE 2 MG/2ML
INJECTION, SOLUTION INTRAMUSCULAR; INTRAVENOUS AS NEEDED
Status: DISCONTINUED | OUTPATIENT
Start: 2021-05-23 | End: 2021-05-23

## 2021-05-23 RX ORDER — FENTANYL CITRATE-0.9 % NACL/PF 10 MCG/ML
75 PLASTIC BAG, INJECTION (ML) INTRAVENOUS CONTINUOUS
Status: DISCONTINUED | OUTPATIENT
Start: 2021-05-23 | End: 2021-05-25

## 2021-05-23 RX ORDER — LIDOCAINE HYDROCHLORIDE 40 MG/ML
INJECTION, SOLUTION RETROBULBAR; TOPICAL AS NEEDED
Status: DISCONTINUED | OUTPATIENT
Start: 2021-05-23 | End: 2021-05-23

## 2021-05-23 RX ORDER — SUCCINYLCHOLINE/SOD CL,ISO/PF 100 MG/5ML
SYRINGE (ML) INTRAVENOUS AS NEEDED
Status: DISCONTINUED | OUTPATIENT
Start: 2021-05-23 | End: 2021-05-23

## 2021-05-23 RX ORDER — KETAMINE HYDROCHLORIDE 50 MG/ML
INJECTION, SOLUTION, CONCENTRATE INTRAMUSCULAR; INTRAVENOUS AS NEEDED
Status: DISCONTINUED | OUTPATIENT
Start: 2021-05-23 | End: 2021-05-23

## 2021-05-23 RX ADMIN — DEXTROSE MONOHYDRATE 25 ML: 25 INJECTION, SOLUTION INTRAVENOUS at 15:25

## 2021-05-23 RX ADMIN — PROPOFOL 200 MG: 10 INJECTION, EMULSION INTRAVENOUS at 17:48

## 2021-05-23 RX ADMIN — SODIUM CHLORIDE, SODIUM LACTATE, POTASSIUM CHLORIDE, AND CALCIUM CHLORIDE: .6; .31; .03; .02 INJECTION, SOLUTION INTRAVENOUS at 17:31

## 2021-05-23 RX ADMIN — FENTANYL CITRATE 50 MCG: 50 INJECTION, SOLUTION INTRAMUSCULAR; INTRAVENOUS at 04:34

## 2021-05-23 RX ADMIN — ROCURONIUM BROMIDE 20 MG: 10 SOLUTION INTRAVENOUS at 17:55

## 2021-05-23 RX ADMIN — CHLORHEXIDINE GLUCONATE 0.12% ORAL RINSE 15 ML: 1.2 LIQUID ORAL at 20:54

## 2021-05-23 RX ADMIN — ONDANSETRON 4 MG: 2 INJECTION INTRAMUSCULAR; INTRAVENOUS at 18:03

## 2021-05-23 RX ADMIN — SODIUM CHLORIDE 3 G: 9 INJECTION, SOLUTION INTRAVENOUS at 17:54

## 2021-05-23 RX ADMIN — SODIUM CHLORIDE 125 ML/HR: 0.9 INJECTION, SOLUTION INTRAVENOUS at 15:29

## 2021-05-23 RX ADMIN — DEXMEDETOMIDINE HYDROCHLORIDE 12 MCG: 100 INJECTION, SOLUTION INTRAVENOUS at 17:36

## 2021-05-23 RX ADMIN — SODIUM CHLORIDE 125 ML/HR: 0.9 INJECTION, SOLUTION INTRAVENOUS at 11:25

## 2021-05-23 RX ADMIN — SODIUM CHLORIDE 3 G: 9 INJECTION, SOLUTION INTRAVENOUS at 12:57

## 2021-05-23 RX ADMIN — PHENYLEPHRINE HYDROCHLORIDE 2 DROP: 1 SPRAY NASAL at 17:33

## 2021-05-23 RX ADMIN — HYDROMORPHONE HYDROCHLORIDE 0.5 MG: 1 INJECTION, SOLUTION INTRAMUSCULAR; INTRAVENOUS; SUBCUTANEOUS at 11:22

## 2021-05-23 RX ADMIN — DEXMEDETOMIDINE HYDROCHLORIDE 8 MCG: 100 INJECTION, SOLUTION INTRAVENOUS at 17:40

## 2021-05-23 RX ADMIN — PROPOFOL 50 MCG/KG/MIN: 10 INJECTION, EMULSION INTRAVENOUS at 20:54

## 2021-05-23 RX ADMIN — DEXTROSE AND SODIUM CHLORIDE 100 ML/HR: 5; .9 INJECTION, SOLUTION INTRAVENOUS at 15:28

## 2021-05-23 RX ADMIN — SODIUM CHLORIDE, SODIUM GLUCONATE, SODIUM ACETATE, POTASSIUM CHLORIDE, MAGNESIUM CHLORIDE, SODIUM PHOSPHATE, DIBASIC, AND POTASSIUM PHOSPHATE 75 ML/HR: .53; .5; .37; .037; .03; .012; .00082 INJECTION, SOLUTION INTRAVENOUS at 19:30

## 2021-05-23 RX ADMIN — FENTANYL CITRATE 50 MCG: 50 INJECTION INTRAMUSCULAR; INTRAVENOUS at 19:41

## 2021-05-23 RX ADMIN — KETOROLAC TROMETHAMINE 15 MG: 30 INJECTION, SOLUTION INTRAMUSCULAR at 01:16

## 2021-05-23 RX ADMIN — Medication 100 MG: at 17:48

## 2021-05-23 RX ADMIN — LIDOCAINE HYDROCHLORIDE 2.5 ML: 40 INJECTION, SOLUTION RETROBULBAR; TOPICAL at 17:31

## 2021-05-23 RX ADMIN — AMPICILLIN SODIUM AND SULBACTAM SODIUM 3 G: 2; 1 INJECTION, POWDER, FOR SOLUTION INTRAMUSCULAR; INTRAVENOUS at 06:29

## 2021-05-23 RX ADMIN — PROPOFOL 50 MCG/KG/MIN: 10 INJECTION, EMULSION INTRAVENOUS at 19:30

## 2021-05-23 RX ADMIN — MIDAZOLAM HYDROCHLORIDE 2 MG: 1 INJECTION, SOLUTION INTRAMUSCULAR; INTRAVENOUS at 17:36

## 2021-05-23 RX ADMIN — FENTANYL CITRATE 50 MCG: 50 INJECTION, SOLUTION INTRAMUSCULAR; INTRAVENOUS at 08:09

## 2021-05-23 RX ADMIN — AMPICILLIN SODIUM AND SULBACTAM SODIUM 3 G: 2; 1 INJECTION, POWDER, FOR SOLUTION INTRAMUSCULAR; INTRAVENOUS at 01:47

## 2021-05-23 RX ADMIN — Medication 50 MCG/HR: at 19:29

## 2021-05-23 RX ADMIN — KETAMINE HYDROCHLORIDE 25 MG: 50 INJECTION INTRAMUSCULAR; INTRAVENOUS at 17:36

## 2021-05-23 RX ADMIN — REMIFENTANIL HYDROCHLORIDE 0.03 MCG/KG/MIN: 1 INJECTION, POWDER, LYOPHILIZED, FOR SOLUTION INTRAVENOUS at 17:38

## 2021-05-23 RX ADMIN — DEXAMETHASONE SODIUM PHOSPHATE 8 MG: 10 INJECTION, SOLUTION INTRAMUSCULAR; INTRAVENOUS at 18:03

## 2021-05-23 RX ADMIN — DEXMEDETOMIDINE HYDROCHLORIDE 8 MCG: 100 INJECTION, SOLUTION INTRAVENOUS at 17:38

## 2021-05-23 RX ADMIN — GLYCOPYRROLATE 0.3 MG: 0.2 INJECTION, SOLUTION INTRAMUSCULAR; INTRAVENOUS at 17:35

## 2021-05-23 RX ADMIN — PHENYLEPHRINE HYDROCHLORIDE 2 DROP: 1 SPRAY NASAL at 17:31

## 2021-05-23 NOTE — ASSESSMENT & PLAN NOTE
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

## 2021-05-23 NOTE — ED PROVIDER NOTES
History  Chief Complaint   Patient presents with    Dental Pain     pt has had 2 right lower teeth that are abcessed since before mothers day   was on antibiotic but still cannot eat and it hurts him really bad now     HPI  52-year-old male comes in her it is now 4th visit for right lower dental pain  He was seen here on the 10th, was seen in [facility] on the 11th and was seen again here on the 13th  On the 13th he had a CT scan of his face which showed 9 mm abscess  He was placed on antibiotics  Patient states he has been taking antibiotics  States he tried to get in to see OMS, the patient states he could not get in  He is now having worsening swelling of his right face, pain in his right face, pain with movement of his mouth  Patient states he is having hard time eating secondary to not being able to open his mouth  He denies any fevers or chills, denies neck pain back pain or abdominal pain  Prior to Admission Medications   Prescriptions Last Dose Informant Patient Reported? Taking?   naproxen (NAPROSYN) 500 mg tablet 5/21/2021 at Unknown time  No Yes   Sig: Take 1 tablet (500 mg total) by mouth 2 (two) times a day with meals   naproxen (NAPROSYN) 500 mg tablet   No No   Sig: Take 1 tablet (500 mg total) by mouth 2 (two) times a day with meals for 10 doses   penicillin V potassium (VEETID) 500 mg tablet 5/22/2021 at Unknown time  Yes Yes   Sig: Take 500 mg by mouth every 6 (six) hours      Facility-Administered Medications: None       Past Medical History:   Diagnosis Date    Asthma        History reviewed  No pertinent surgical history  History reviewed  No pertinent family history  I have reviewed and agree with the history as documented      E-Cigarette/Vaping    E-Cigarette Use Never User      E-Cigarette/Vaping Substances    Nicotine No     THC No     CBD No     Flavoring No     Other No     Unknown No      Social History     Tobacco Use    Smoking status: Never Smoker    Smokeless tobacco: Never Used   Substance Use Topics    Alcohol use: Never     Frequency: Never    Drug use: Never       Review of Systems   Constitutional: Negative  Negative for chills and fever  HENT: Positive for dental problem and facial swelling  Negative for ear pain and sore throat  Eyes: Negative  Negative for pain and discharge  Respiratory: Negative  Negative for chest tightness and shortness of breath  Cardiovascular: Negative  Negative for chest pain and palpitations  Gastrointestinal: Negative  Negative for abdominal pain, nausea and vomiting  Endocrine: Negative  Negative for polyphagia and polyuria  Genitourinary: Negative  Negative for dysuria and flank pain  Musculoskeletal: Negative  Negative for arthralgias and back pain  Skin: Negative  Negative for color change and wound  Allergic/Immunologic: Negative  Negative for food allergies and immunocompromised state  Neurological: Negative  Negative for weakness and headaches  Hematological: Negative  Negative for adenopathy  Does not bruise/bleed easily  Psychiatric/Behavioral: Negative  Negative for suicidal ideas  The patient is not nervous/anxious  Physical Exam  Physical Exam  Vitals signs and nursing note reviewed  Constitutional:       General: He is not in acute distress  Appearance: Normal appearance  He is not diaphoretic  HENT:      Head: Normocephalic and atraumatic  Right Ear: External ear normal       Left Ear: External ear normal       Nose: Nose normal  No congestion or rhinorrhea  Mouth/Throat:      Mouth: Mucous membranes are moist       Comments: Patient has swelling at the angle of the mandible on the right  He does have poor dentition of his mouth  He has trismus  The submental space on the right side shows induration  I cannot touch the floor the mouth on the inside secondary to trismus  He has no stridor, he is controlling secretions    Eyes:      General: No scleral icterus  Right eye: No discharge  Left eye: No discharge  Conjunctiva/sclera: Conjunctivae normal       Pupils: Pupils are equal, round, and reactive to light  Neck:      Musculoskeletal: Normal range of motion and neck supple  No neck rigidity  Cardiovascular:      Rate and Rhythm: Regular rhythm  Pulses: Normal pulses  Heart sounds: Normal heart sounds  Pulmonary:      Effort: Pulmonary effort is normal  No respiratory distress  Breath sounds: Normal breath sounds  No stridor  No wheezing, rhonchi or rales  Abdominal:      General: Abdomen is flat  There is no distension  Palpations: Abdomen is soft  Tenderness: There is no abdominal tenderness  There is no guarding  Musculoskeletal: Normal range of motion  General: No swelling, tenderness or deformity  Skin:     General: Skin is warm and dry  Capillary Refill: Capillary refill takes less than 2 seconds  Findings: No lesion or rash  Neurological:      General: No focal deficit present  Mental Status: He is alert and oriented to person, place, and time  Cranial Nerves: No cranial nerve deficit  Sensory: No sensory deficit  Psychiatric:         Mood and Affect: Mood normal          Behavior: Behavior normal          Thought Content:  Thought content normal          Vital Signs  ED Triage Vitals [05/22/21 2154]   Temperature Pulse Respirations Blood Pressure SpO2   98 8 °F (37 1 °C) 78 18 128/78 98 %      Temp Source Heart Rate Source Patient Position - Orthostatic VS BP Location FiO2 (%)   Tympanic Monitor Sitting Right arm --      Pain Score       9           Vitals:    05/23/21 0500 05/23/21 0630 05/23/21 0730 05/23/21 0842   BP: 122/68 114/56 107/57 107/57   Pulse: 66 81 72 72   Patient Position - Orthostatic VS: Lying Lying Lying          Visual Acuity      ED Medications  Medications   iohexol (OMNIPAQUE) 350 MG/ML injection (SINGLE-DOSE) 100 mL (85 mL Intravenous Given 5/22/21 2315)   ampicillin-sulbactam (UNASYN) 3 g in sodium chloride 0 9 % 100 mL IVPB (0 g Intravenous Stopped 5/23/21 0217)   ketorolac (TORADOL) injection 15 mg (15 mg Intravenous Given 5/23/21 0116)   fentanyl citrate (PF) 100 MCG/2ML 50 mcg (50 mcg Intravenous Given 5/23/21 0434)   ampicillin-sulbactam (UNASYN) 3 g in sodium chloride 0 9 % 100 mL IVPB (0 g Intravenous Stopped 5/23/21 0739)   fentanyl citrate (PF) 100 MCG/2ML 50 mcg (50 mcg Intravenous Given 5/23/21 0809)       Diagnostic Studies  Results Reviewed     Procedure Component Value Units Date/Time    POCT Blood Gas (CG8+) [[id number]]  (Abnormal) Collected: 05/23/21 2142    Lab Status: Final result Specimen: Arterial Updated: 05/23/21 2149     pH, Art i-STAT 7 426     pCO2, Art i-STAT 34 6 mm HG      pO2, ART i-STAT 205 0 mm HG      BE, i-STAT -1 mmol/L      HCO3, Art i-STAT 22 8 mmol/L      CO2, i-STAT 24 mmol/L      O2 Sat, i-STAT 100 %      SODIUM, I-STAT 139 mmol/l      Potassium, i-STAT 4 0 mmol/L      Hct, i-STAT 36 %      Hgb, i-STAT 12 2 g/dl      Glucose, i-STAT 115 mg/dl      POC FIO2 40 L      Specimen Type ARTERIAL    Fingerstick Glucose (POCT) [[id number]]  (Normal) Collected: 05/23/21 1552    Lab Status: Final result Updated: 05/23/21 1554     POC Glucose 110 mg/dl     Fingerstick Glucose (POCT) [[id number]]  (Normal) Collected: 05/23/21 1513    Lab Status: Final result Updated: 05/23/21 1515     POC Glucose 65 mg/dl     Blood culture #1 [[id number]] Collected: 05/23/21 0122    Lab Status: Preliminary result Specimen: Blood from Arm, Right Updated: 05/23/21 1401     Blood Culture Received in Microbiology Lab  Culture in Progress  Blood culture #2 [[id number]] Collected: 05/23/21 0122    Lab Status: Preliminary result Specimen: Blood from Arm, Left Updated: 05/23/21 1401     Blood Culture Received in Microbiology Lab  Culture in Progress      Basic metabolic panel [[id number]] Collected: 05/22/21 5455    Lab Status: Final result Specimen: Blood from Arm, Right Updated: 05/22/21 2314     Sodium 140 mmol/L      Potassium 3 8 mmol/L      Chloride 102 mmol/L      CO2 28 mmol/L      ANION GAP 10 mmol/L      BUN 13 mg/dL      Creatinine 1 12 mg/dL      Glucose 91 mg/dL      Calcium 9 3 mg/dL      eGFR 95 ml/min/1 73sq m     Narrative:      [place] guidelines for Chronic Kidney Disease (CKD):     Stage 1 with normal or high GFR (GFR > 90 mL/min/1 73 square meters)    Stage 2 Mild CKD (GFR = 60-89 mL/min/1 73 square meters)    Stage 3A Moderate CKD (GFR = 45-59 mL/min/1 73 square meters)    Stage 3B Moderate CKD (GFR = 30-44 mL/min/1 73 square meters)    Stage 4 Severe CKD (GFR = 15-29 mL/min/1 73 square meters)    Stage 5 End Stage CKD (GFR <15 mL/min/1 73 square meters)  Note: GFR calculation is accurate only with a steady state creatinine    CBC and differential [[id number]]  (Abnormal) Collected: 05/22/21 225    Lab Status: Final result Specimen: Blood from Arm, Right Updated: 05/22/21 2305     WBC 16 03 Thousand/uL      RBC 4 69 Million/uL      Hemoglobin 14 2 g/dL      Hematocrit 40 1 %      MCV 86 fL      MCH 30 3 pg      MCHC 35 4 g/dL      RDW 11 8 %      MPV 8 9 fL      Platelets 013 Thousands/uL      nRBC 0 /100 WBCs      Neutrophils Relative 82 %      Immat GRANS % 0 %      Lymphocytes Relative 12 %      Monocytes Relative 5 %      Eosinophils Relative 1 %      Basophils Relative 0 %      Neutrophils Absolute 13 11 Thousands/µL      Immature Grans Absolute 0 05 Thousand/uL      Lymphocytes Absolute 1 84 Thousands/µL      Monocytes Absolute 0 86 Thousand/µL      Eosinophils Absolute 0 14 Thousand/µL      Basophils Absolute 0 03 Thousands/µL                  CT soft tissue neck with contrast   Final Result by Martin Hernandez MD (05/23 0033)      Significant interval worsening in inflammatory change involving the right submandibular, sublingual regions and lateral floor of the the mouth with a new 3 6 x 2 6 x 4 2 cm abscess, when compared to a CT neck dated May 14, 2021  The findings are    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  Again, the site infection appears to be related to a subcentimeter odontogenic abscess along the right mandible at the level of the 1st molar tooth  Patent airway  Workstation performed: [id number]                    Procedures  Procedures         ED Course      spoke with [facility]  Regardless of what the CT scan says, they did not believe that this is a true Saul's given it is only on 1 side  However patient has failed multiple rounds of outpatient antibiotics  He is getting IV antibiotics here  For this reason, [facility] recommended transfer for evaluation from them  Patient will be going to medicine at [facility]      Most Recent Value   SBIRT (13-23 yo)   In order to provide better care to our patients, we are screening all of our patients for alcohol and drug use  Would it be okay to ask you these screening questions? Yes Filed at: 05/22/2021 2257   [facility] Initial Screen: During the past 12 months, did you:   1  Drink any alcohol (more than a few sips)? No Filed at: 05/22/2021 2257   2  Smoke any marijuana or hashish  No Filed at: 05/22/2021 2257   3  Use anything else to get high? ("anything else" includes illegal drugs, over the counter and prescription drugs, and things that you sniff or 'roy')? No Filed at: 05/22/2021 2257                                        [facility]  Number of Diagnoses or Management Options  Dental abscess:   Diagnosis management comments: 78-year-old male comes in with right-sided facial swelling  Will get lab work  Will get CT scan        Disposition  Final diagnoses:   Dental abscess     Time reflects when diagnosis was documented in both MDM as applicable and the Disposition within this note     Time User Action Codes Description Comment    5/23/2021  1:51 AM Jake Castro Add [K04 7] Dental abscess       ED Disposition     ED Disposition Condition Date/Time Comment    Transfer to Another Facility-In Network  Sun May 23, 2021  1:51 AM Myesha Berry should be transferred out to [facility]          MD Documentation      Most Recent Value   Patient Condition  The patient has been stabilized such that within reasonable medical probability, no material deterioration of the patient condition or the condition of the unborn child(lazara) is likely to result from the transfer   Reason for Transfer  Level of Care needed not available at this facility   Benefits of Transfer  Specialized equipment and/or services available at the receiving facility (Include comment)________________________   Risks of Transfer  Potential for delay in receiving treatment   Accepting Physician  Dr Anahi Shah Name, Andie Caller   Sending MD  Dr Astrid Cevallos   Provider Certification  General risk, such as traffic hazards, adverse weather conditions, rough terrain or turbulence, possible failure of equipment (including vehicle or aircraft), or consequences of actions of persons outside the control of the transport personnel      RN Documentation      Most [address] Name, [address] Assignment  [address]   Report Given to  Sarah Guajardo   Medications Reviewed with Next Provider of Service  Yes   Transport Mode  Ambulance   Level of Care  Basic life support   Copies of Medical Records Sent  History and Physical, Orders, Progress note, Transfer form, Nursing note, Radiology, Labs, Med Rec form   Patient Belongings Disposition  Sent with patient   Transfer Date  05/23/21   Transfer Time  0842      Follow-up Information    None         Discharge Medication List as of 5/23/2021  8:43 AM      CONTINUE these medications which have NOT CHANGED    Details   naproxen (NAPROSYN) 500 mg tablet Take 1 tablet (500 mg total) by mouth 2 (two) times a day with meals, Starting Sat 2/22/2020, Print      penicillin V potassium (VEETID) 500 mg tablet Take 500 mg by mouth every 6 (six) hours, Historical Med           No discharge procedures on file      PDMP Review     None          ED Provider  Electronically Signed by           Braden De La Paz MD  05/24/21 9500

## 2021-05-23 NOTE — ASSESSMENT & PLAN NOTE
· Present upon admission at 13  · Likely in the setting of acute dental infection  · No other SIRS criteria met  · Continue antibiotics    · Monitor CBC with differential

## 2021-05-23 NOTE — H&P
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  Again, the site infection appears to be related to a subcentimeter odontogenic abscess along the right mandible at the level of the 1st molar tooth "  · Airway appears patent  Monitor respiratory status closely  · Continue IV Unasyn  · Consult OMFS  · NPO status  Start IV hydration  · Pain control  Leukocytosis  Assessment & Plan  · Present upon admission at 17  · Likely in the setting of acute dental infection  · No other SIRS criteria met  · Continue antibiotics  · Monitor CBC with differential     VTE Prophylaxis: low risk VTE  / sequential compression device   Code Status: full code  POLST: POLST form is not discussed and not completed at this time  Discussion with family: patient deferred family update    Anticipated Length of Stay:  Patient will be admitted on an Inpatient basis with an anticipated length of stay of  > 2 midnights  Justification for Hospital Stay:  Dental abscess    Total Time for Visit, including Counseling / Coordination of Care: 70 minutes  Greater than 50% of this total time spent on direct patient counseling and coordination of care  Chief Complaint:   Dental pain    History of Present Illness:    Raz Leary is a 24 y o  male who presents with right-sided dental pain with abscess  Initially presented to [address] [place], [place] [address]    Patient had 4 ER visits secondary to right-sided dental pain and failed outpatient antibiotics  Had repeat CT which showed abscess  There was concern for Saul's angina and the patient was transferred to [address] [place] [address] to see OMFS and started on IV Unasyn  Still reporting swelling in the right side of his face and severe dental pain  No difficulty with breathing and his airway is not currently threatened  Denies any fevers or chills  Is not able to eat any food secondary to swelling  Review of Systems:     Review of Systems   Constitutional: Negative for activity change, appetite change, chills, fatigue and fever  HENT: Positive for dental problem and facial swelling  Negative for congestion, rhinorrhea, sinus pressure and sore throat  Eyes: Negative for photophobia, pain and visual disturbance  Respiratory: Negative for cough, shortness of breath and wheezing  Cardiovascular: Negative for chest pain, palpitations and leg swelling  Gastrointestinal: Negative for abdominal distention, abdominal pain, constipation, diarrhea, nausea and vomiting  Endocrine: Negative for cold intolerance, heat intolerance, polydipsia and polyuria  Genitourinary: Negative for difficulty urinating, dysuria, flank pain, frequency and hematuria  Musculoskeletal: Negative for arthralgias, back pain and joint swelling  Skin: Negative for color change, pallor and rash  Allergic/Immunologic: Negative  Neurological: Negative for dizziness, syncope, weakness, light-headedness and headaches  Hematological: Negative  Psychiatric/Behavioral: Negative  Past Medical and Surgical History:     Past Medical History:   Diagnosis Date    Asthma        No past surgical history on file  Meds/Allergies:    Prior to Admission medications    Medication Sig Start Date End Date Taking?  Authorizing Provider   naproxen (NAPROSYN) 500 mg tablet Take 1 tablet (500 mg total) by mouth 2 (two) times a day with meals 2/22/20   Arpita Epstein DO   naproxen (NAPROSYN) 500 mg tablet Take 1 tablet (500 mg total) by mouth 2 (two) times a day with meals for 10 doses 5/10/21 5/15/21  Stephie Willett DO   penicillin V potassium (VEETID) 500 mg tablet Take 500 mg by mouth every 6 (six) hours    Historical Provider, MD NOLAN have reviewed home medications with patient personally  Allergies: No Known Allergies    Social History:     Marital Status: /Civil Union   Occupation: non-contributory  Patient Pre-hospital Living Situation: home  Patient Pre-hospital Level of Mobility: full  Patient Pre-hospital Diet Restrictions: none  Substance Use History:   Social History     Substance and Sexual Activity   Alcohol Use Never    Frequency: Never     Social History     Tobacco Use   Smoking Status Never Smoker   Smokeless Tobacco Never Used     Social History     Substance and Sexual Activity   Drug Use Never       Family History:    non-contributory    Physical Exam:     Vitals:        Physical Exam  Vitals signs and nursing note reviewed  Constitutional:       General: He is not in acute distress  Appearance: Normal appearance  HENT:      Head: Normocephalic  Jaw: Swelling present  Mouth/Throat:      Mouth: Mucous membranes are moist    Eyes:      Pupils: Pupils are equal, round, and reactive to light  Neck:      Musculoskeletal: Normal range of motion  Cardiovascular:      Rate and Rhythm: Normal rate and regular rhythm  Heart sounds: No murmur  Pulmonary:      Effort: Pulmonary effort is normal  No respiratory distress  Breath sounds: Normal breath sounds  No wheezing, rhonchi or rales  Abdominal:      General: Bowel sounds are normal  There is no distension  Palpations: Abdomen is soft  Tenderness: There is no abdominal tenderness  There is no guarding  Musculoskeletal:         General: No deformity  Right lower leg: No edema  Left lower leg: No edema     Skin:     Capillary Refill: Capillary refill takes less than 2 seconds  Neurological:      General: No focal deficit present  Mental Status: He is alert and oriented to person, place, and time  Mental status is at baseline  Additional Data:     Lab Results: I have personally reviewed pertinent reports  Results from last 7 days   Lab Units 05/22/21  2256   WBC Thousand/uL 16 03*   HEMOGLOBIN g/dL 14 2   HEMATOCRIT % 40 1   PLATELETS Thousands/uL 433*   NEUTROS PCT % 82*   LYMPHS PCT % 12*   MONOS PCT % 5   EOS PCT % 1     Results from last 7 days   Lab Units 05/22/21  2256   SODIUM mmol/L 140   POTASSIUM mmol/L 3 8   CHLORIDE mmol/L 102   CO2 mmol/L 28   BUN mg/dL 13   CREATININE mg/dL 1 12   ANION GAP mmol/L 10   CALCIUM mg/dL 9 3   GLUCOSE RANDOM mg/dL 91                       Imaging: I have personally reviewed pertinent reports  No orders to display       EKG, Pathology, and Other Studies Reviewed on Admission:   · Prior pertinent studies and records reviewed in [facility]  [facility] Records Reviewed: Yes     ** Please Note: This note has been constructed using a voice recognition system   **

## 2021-05-23 NOTE — ANESTHESIA PREPROCEDURE EVALUATION
Procedure:  INCISION AND DRAINAGE (I&D) HEAD/FACE, extraction of necessary teeth (Bilateral Face)    Relevant Problems   No relevant active problems        Physical Exam    Airway  Comment: Unable to open his mouth    TM Distance: >3 FB  Neck ROM: limited     Dental       Cardiovascular      Pulmonary      Other Findings        Anesthesia Plan  ASA Score- 3 Emergent    Anesthesia Type- general with ASA Monitors  Additional Monitors:   Airway Plan: ETT  Comment: Patient seen and examined  History reviewed  Patient to be done under general anesthesia with ETT and routine monitors  Will attempt nasal awake fiber-optic intubation  Trauma and OMFS surgeons in room on standby  Risks discussed with the patient  Consent obtained          Plan Factors-Exercise tolerance (METS): >4 METS  Chart reviewed  Existing labs reviewed  Patient summary reviewed  Induction- intravenous and rapid sequence induction  Postoperative Plan- Plan for postoperative opioid use  Planned trial extubation    Informed Consent- Anesthetic plan and risks discussed with patient  I personally reviewed this patient with the CRNA  Discussed and agreed on the Anesthesia Plan with the CRNA  Oskar Fulton

## 2021-05-23 NOTE — ED NOTES
To be transferred to [address] [place] [address], room 303    phone [phone]  Accepting physician Dr Mirela Gomez pass Ambulance to  at [address], RN  05/23/21 4504

## 2021-05-23 NOTE — ANESTHESIA POSTPROCEDURE EVALUATION
Post-Op Assessment Note    CV Status:  Stable    Pain control: unable to assess  Post-procedure mental status: sedated with propofol 50mcg/kg  hr  PONV status: unable to assess  Airway Patency:  Patent  Airway: intubated (severe oral phaynx swelling)      Post Op Vitals Reviewed: Yes      Staff: Anesthesiologist, CRNA         No complications documented      BP  102/68    Temp 97   Pulse  78   Resp   14   SpO2   98

## 2021-05-23 NOTE — OP NOTE
OPERATIVE REPORT  PATIENT NAME: Denisha Neely    :  2000  MRN: [id number]  Pt Location: AN OR ROOM 01    SURGERY DATE: 2021    Surgeon(s) and Role:     * Nora Spivey DMD - Primary     * Radha Magana DMD - Assisting/Resident    Preop Diagnosis:  Dental abscess [K04 7]    Post-Op Diagnosis Codes:     * Dental abscess [K04 7]    Procedure(s) (LRB):  INCISION AND DRAINAGE (I&D) HEAD/FACE, extraction of teeth 30, 31, 32  incision and drainage right submandibular, right sublingual, right submasseteric, and right pterygomandibular space infection (Right)    Specimen(s):  ID Type Source Tests Collected by Time Destination   A : right mandible cultures  Tissue Wound ANAEROBIC CULTURE AND GRAM STAIN, WOUND CULTURE Nora Spivey DMD 2021 1801        Estimated Blood Loss:   Minimal    Drains:  Open Drain Right Neck (Active)   Number of days: 0       Anesthesia Type:   General    Operative Indications:  Dental abscess [K367]  52-year-old male with 3 week history of increasing right mandibular pain and swelling  Patient seen by multiple practitioners but no definitive treatment performed  Patient was admitted to [facility] for incision drainage extraction of teeth  Operative Findings:  Complete bony impaction tooth number 32  Gross decay teeth #93 and #99    Complications:   None    Procedure and Technique:  The patient was identified in the preoperative holding area  Planned procedure was reviewed with patient  All questions were answered  Informed consent was obtained  The patient was transferred to the operating room and was placed supine on the operating table  An awake fiberoptic intubation was performed with trauma attending on standby for possible emergent tracheostomy  Patient was intubated via oral endotracheal tube  General anesthesia was induced  Patient was prepped and draped in the appropriate fashion    The right inferior border of the mandible was marked with a marking pen  An incision was made approximately 1 5 cm below the inferior border mandible on the right side  The incision was made with a 15 blade through skin  Blunt dissection was then performed to the inferior border of the right mandible  Blunt dissection was performed medially into the right submandibular space  Copious amounts of purulent drainage was established  Fluid was sent for culture and sensitivity  Blunt dissection was continued superiorly into the right sublingual space and additional drainage was established  The sublingual mucosa was pierced through and through quarter-inch Penrose drain was placed  The drain was affixed to the skin with a 3-0 nylon suture  Blunt dissection was performed laterally along the inferior border of the mandible until the right sub masseteric space was entered  Drainage was established  All areas were irrigated with copious amounts of saline solution  A bite block was placed  A pharyngeal packing was placed  A reverse buccal hockey-stick incision was made over bone from the right external oblique ridge to the mesial surface of tooth #30  The flap was reflected in a subperiosteal plane  Buccal bone was removed at each site  Teeth #30 and #31 were extracted surgically without complications  A rotary instrument with copious irrigation was used to remove occlusal and buccal bone at impacted tooth #32  The tooth was elevated and surgically extracted without complication  The follicle was removed  The alveolar ridge was smoothed with a bone file  All of the extraction sites were cleaned and irrigated  A curved mosquito was used to bluntly dissect along the medial aspect the right mandibular ramus into the right pterygomandibular space  The space was entered  Purulent drainage was established  The site was irrigated with copious amounts of saline solution  The flap was replaced and sutured with multiple 3-0 chromic gut sutures    The pharyngeal packing was removed  The bite block was removed  The patient remained stable throughout the procedure  The patient remained intubated was transported to the ICU for airway monitoring  I was present for the entire procedure    Patient Disposition:  intubated and hemodynamically stable      Donita Lora DMD  DATE: May 23, 2021  TIME: 6:31 PM

## 2021-05-23 NOTE — EMTALA/ACUTE CARE TRANSFER
[address] [facility] EMERGENCY DEPARTMENT  [address] [facility] [address]  Dept: [phone]      EMTALA TRANSFER CONSENT    NAME Carley Littlejohn                                         2000                              MRN [id number]    I have been informed of my rights regarding examination, treatment, and transfer   by Dr Denise Chavez MD    Benefits: Specialized equipment and/or services available at the receiving facility (Include comment)________________________    Risks: Potential for delay in receiving treatment      Consent for Transfer:  I acknowledge that my medical condition has been evaluated and explained to me by the emergency department physician or other qualified medical person and/or my attending physician, who has recommended that I be transferred to the service of  Accepting Physician: Dave Spaulding at [address] [place] [address] Name, [address] : THE [facility]  The above potential benefits of such transfer, the potential risks associated with such transfer, and the probable risks of not being transferred have been explained to me, and I fully understand them  The doctor has explained that, in my case, the benefits of transfer outweigh the risks  I agree to be transferred  I authorize the performance of emergency medical procedures and treatments upon me in both transit and upon arrival at the receiving facility  Additionally, I authorize the release of any and all medical records to the receiving facility and request they be transported with me, if possible  I understand that the safest mode of transportation during a medical emergency is an ambulance and that the Hospital advocates the use of this mode of transport  Risks of traveling to the receiving facility by car, including absence of medical control, life sustaining equipment, such as oxygen, and medical personnel has been explained to me and I fully understand them      (NESTOR CORRECT BOX BELOW)  [  ]  I consent to the stated transfer and to be transported by ambulance/helicopter  [  ]  I consent to the stated transfer, but refuse transportation by ambulance and accept full responsibility for my transportation by car  I understand the risks of non-ambulance transfers and I exonerate the Hospital and its staff from any deterioration in my condition that results from this refusal     X___________________________________________    DATE  21  TIME________  Signature of patient or legally responsible individual signing on patient behalf           RELATIONSHIP TO PATIENT_________________________          Provider Certification    NAME Carley Littlejohn                                         2000                              MRN [id number]    A medical screening exam was performed on the above named patient  Based on the examination:    Condition Necessitating Transfer The encounter diagnosis was Dental abscess  Patient Condition: The patient has been stabilized such that within reasonable medical probability, no material deterioration of the patient condition or the condition of the unborn child(lazara) is likely to result from the transfer    Reason for Transfer: Level of Care needed not available at this facility    Transfer Requirements: [address]   · Space available and qualified personnel available for treatment as acknowledged by    · Agreed to accept transfer and to provide appropriate medical treatment as acknowledged by       [place]  · Appropriate medical records of the examination and treatment of the patient are provided at the time of transfer   [address], [address] _______  · Transfer will be performed by qualified personnel from    and appropriate transfer equipment as required, including the use of necessary and appropriate life support measures      Provider Certification: I have examined the patient and explained the following risks and benefits of being transferred/refusing transfer to the patient/family:  General risk, such as traffic hazards, adverse weather conditions, rough terrain or turbulence, possible failure of equipment (including vehicle or aircraft), or consequences of actions of persons outside the control of the transport personnel      Based on these reasonable risks and benefits to the patient and/or the unborn child(lazara), and based upon the information available at the time of the patients examination, I certify that the medical benefits reasonably to be expected from the provision of appropriate medical treatments at another medical facility outweigh the increasing risks, if any, to the individuals medical condition, and in the case of labor to the unborn child, from effecting the transfer      X____________________________________________ DATE 05/23/21        TIME_______      ORIGINAL - SEND TO MEDICAL RECORDS   COPY - SEND WITH PATIENT DURING TRANSFER

## 2021-05-23 NOTE — CONSULTS
Oral and Maxillofacial Surgery Consult    Pt seen 05/23/21 5:02 PM     Assessment  24 y o  male  evaluated for dental abscess  On bedside dental exam, patient has significant right mandibular swelling with overlying erythema and trismus  Plan:  - OR emergently for incision and drainage of multi-space odontogenic infection via intraoral and extraoral approaches, extraction of necessary teeth  - NPO/mIVF  - Continue IV abx in perioperative period    D/w OMFS attg on call, Dr Mayte Diallo     HPI: 24 y o  male w no PMHx/PSHx presents for a repeated time for evaluation of a dental abscess  Patient has been seen and evaluated at multiple hospitals then previously discharge  He has 6/10 pain and states he can't open his mouth and that his voice has been changing in quality  He has not been able to eat for four days  Facial swelling began last Friday night and has progressively worsened  Pt currently admitted for right submandibular swelling, concern for Saul's Angina  He does not see a dentist regularly describes it as throbbing and painful in the neck   Pain 7/10 at the worst  Denies fever, chills, nausea, SOB but endorses odynophagia and dysphagia    PMH:   Past Medical History:   Diagnosis Date    Asthma         Allergies:   No Known Allergies    Meds:     Current Facility-Administered Medications:     [MAR Hold] acetaminophen (TYLENOL) tablet 650 mg, 650 mg, Oral, Q6H PRN, Joshua Al PA-C    ampicillin-sulbactam (UNASYN) 3 g in sodium chloride 0 9 % 100 mL IVPB, 3 g, Intravenous, Q6H, Joshua Al PA-C, Last Rate: 200 mL/hr at 05/23/21 1257, 3 g at 05/23/21 1257    dextrose 5 % and sodium chloride 0 9 % infusion, 100 mL/hr, Intravenous, Continuous, Barbi Montemayor PA-C, Last Rate: 100 mL/hr at 05/23/21 1528, 100 mL/hr at 05/23/21 1528    HYDROmorphone (DILAUDID) injection 0 5 mg, 0 5 mg, Intravenous, Q4H PRN, Joshua Al PA-C, 0 5 mg at 05/23/21 1122    [MAR Hold] HYDROmorphone (DILAUDID) injection 1 mg, 1 mg, Intravenous, Q4H PRN, ALEX Toureic Karyn Hold] ketorolac (TORADOL) injection 15 mg, 15 mg, Intravenous, Q6H PRN, ALEX Tourelay Hold] ondansetron [facility]) injection 4 mg, 4 mg, Intravenous, Q6H PRN, Young Angel PA-C    PSH:   History reviewed  No pertinent surgical history  History reviewed  No pertinent family history  Review of Systems   Constitutional: Negative for chills, diaphoresis and fever  HENT: Positive for dental problem, sore throat and voice change  Negative for drooling, rhinorrhea and trouble swallowing  Significant right submandibular swelling  Eyes: Negative for photophobia and visual disturbance  Respiratory: Negative for shortness of breath and stridor  Cardiovascular: Negative for chest pain and palpitations  Gastrointestinal: Negative  Endocrine: Negative  Genitourinary: Negative  Musculoskeletal: Negative  Psychiatric/Behavioral: Negative  All other systems reviewed and are negative  Temp:  [98 8 °F (37 1 °C)-99 5 °F (37 5 °C)] 99 5 °F (37 5 °C)  HR:  [] 112  Resp:  [2-20] 19  BP: (107-133)/(56-78) 117/72  SpO2:  [96 %-99 %] 98 %       Intake/Output Summary (Last 24 hours) at 5/23/2021 1702  Last data filed at 5/23/2021 1300  Gross per 24 hour   Intake 0 ml   Output 0 ml   Net 0 ml        Physical Exam:  GE: AAOx3  NAD  HEENT:    Head:Significant right submandibular and buccal swelling with overlying erythema  Mouth: ~8mm AELXSANDER  Trismic  Gross caries  Right buccal vestibular fullness in right mandible and no fistula or purulence  No sinus tract, unable to visualize uvula or palpate FOM  (+) soft/non-tender/non-elevated  Uvula midline     Resp: no respiratory distress on RA  CVS: RRR    Lab Results:   CBC:   Lab Results   Component Value Date    WBC 16 03 (H) 05/22/2021    HGB 14 2 05/22/2021    HCT 40 1 05/22/2021    MCV 86 05/22/2021     (H) 05/22/2021    MCH 30 3 05/22/2021    MCHC 35 4 05/22/2021    RDW 11 8 05/22/2021    MPV 8 9 05/22/2021    NRBC 0 05/22/2021   , CMP:   Lab Results   Component Value Date    SODIUM 140 05/22/2021    K 3 8 05/22/2021     05/22/2021    CO2 28 05/22/2021    BUN 13 05/22/2021    CREATININE 1 12 05/22/2021    CALCIUM 9 3 05/22/2021    EGFR 95 05/22/2021   , Coagulation: No results found for: PT, INR, APTT  Imaging: I have personally reviewed pertinent reports  EKG, Pathology, and Other Studies: I have personally reviewed pertinent reports

## 2021-05-23 NOTE — PLAN OF CARE
Problem: PAIN - ADULT  Goal: Verbalizes/displays adequate comfort level or baseline comfort level  Description: Interventions:  - Encourage patient to monitor pain and request assistance  - Assess pain using appropriate pain scale  - Administer analgesics based on type and severity of pain and evaluate response  - Implement non-pharmacological measures as appropriate and evaluate response  - Consider cultural and social influences on pain and pain management  - Notify physician/advanced practitioner if interventions unsuccessful or patient reports new pain  Outcome: Progressing     Problem: INFECTION - ADULT  Goal: Absence or prevention of progression during hospitalization  Description: INTERVENTIONS:  - Assess and monitor for signs and symptoms of infection  - Monitor lab/diagnostic results  - Monitor all insertion sites, i e  indwelling lines, tubes, and drains  - Monitor endotracheal if appropriate and nasal secretions for changes in amount and color  - [place] appropriate cooling/warming therapies per order  - Administer medications as ordered  - Instruct and encourage patient and family to use good hand hygiene technique  - Identify and instruct in appropriate isolation precautions for identified infection/condition  Outcome: Progressing  Goal: Absence of fever/infection during neutropenic period  Description: INTERVENTIONS:  - Monitor WBC    Outcome: Progressing     Problem: SAFETY ADULT  Goal: Patient will remain free of falls  Description: INTERVENTIONS:  - Assess patient frequently for physical needs  -  Identify cognitive and physical deficits and behaviors that affect risk of falls    -  [place] fall precautions as indicated by assessment   - Educate patient/family on patient safety including physical limitations  - Instruct patient to call for assistance with activity based on assessment  - Modify environment to reduce risk of injury  - Consider OT/PT consult to assist with strengthening/mobility  Outcome: Progressing  Goal: Maintain or return to baseline ADL function  Description: INTERVENTIONS:  -  Assess patient's ability to carry out ADLs; assess patient's baseline for ADL function and identify physical deficits which impact ability to perform ADLs (bathing, care of mouth/teeth, toileting, grooming, dressing, etc )  - Assess/evaluate cause of self-care deficits   - Assess range of motion  - Assess patient's mobility; develop plan if impaired  - Assess patient's need for assistive devices and provide as appropriate  - Encourage maximum independence but intervene and supervise when necessary  - Involve family in performance of ADLs  - Assess for home care needs following discharge   - Consider OT consult to assist with ADL evaluation and planning for discharge  - Provide patient education as appropriate  Outcome: Progressing  Goal: Maintain or return mobility status to optimal level  Description: INTERVENTIONS:  - Assess patient's baseline mobility status (ambulation, transfers, stairs, etc )    - Identify cognitive and physical deficits and behaviors that affect mobility  - Identify mobility aids required to assist with transfers and/or ambulation (gait belt, sit-to-stand, lift, walker, cane, etc )  - [place] fall precautions as indicated by assessment  - Record patient progress and toleration of activity level on Mobility SBAR; progress patient to next Phase/Stage  - Instruct patient to call for assistance with activity based on assessment  - Consider rehabilitation consult to assist with strengthening/weightbearing, etc   Outcome: Progressing     Problem: DISCHARGE PLANNING  Goal: Discharge to home or other facility with appropriate resources  Description: INTERVENTIONS:  - Identify barriers to discharge w/patient and caregiver  - Arrange for needed discharge resources and transportation as appropriate  - Identify discharge learning needs (meds, wound care, etc )  - Arrange for interpretive services to assist at discharge as needed  - Refer to Case Management Department for coordinating discharge planning if the patient needs post-hospital services based on physician/advanced practitioner order or complex needs related to functional status, cognitive ability, or social support system  Outcome: Progressing     Problem: Knowledge Deficit  Goal: Patient/family/caregiver demonstrates understanding of disease process, treatment plan, medications, and discharge instructions  Description: Complete learning assessment and assess knowledge base    Interventions:  - Provide teaching at level of understanding  - Provide teaching via preferred learning methods  Outcome: Progressing     Problem: SKIN/TISSUE INTEGRITY - ADULT  Goal: Skin integrity remains intact  Description: INTERVENTIONS  - Identify patients at risk for skin breakdown  - Assess and monitor skin integrity  - Assess and monitor nutrition and hydration status  - Monitor labs (i e  albumin)  - Assess for incontinence   - Turn and reposition patient  - Assist with mobility/ambulation  - Relieve pressure over bony prominences  - Avoid friction and shearing  - Provide appropriate hygiene as needed including keeping skin clean and dry  - Evaluate need for skin moisturizer/barrier cream  - Collaborate with interdisciplinary team (i e  Nutrition, Rehabilitation, etc )   - Patient/family teaching  Outcome: Progressing  Goal: Incision(s), wounds(s) or drain site(s) healing without S/S of infection  Description: INTERVENTIONS  - Assess and document risk factors for skin impairment   - Assess and document dressing, incision, wound bed, drain sites and surrounding tissue  - Consider nutrition services referral as needed  - Oral mucous membranes remain intact  - Provide patient/ family education  Outcome: Progressing  Goal: Oral mucous membranes remain intact  Description: INTERVENTIONS  - Assess oral mucosa and hygiene practices  - Implement preventative oral hygiene regimen  - Implement oral medicated treatments as ordered  - Initiate Nutrition services referral as needed  Outcome: Progressing

## 2021-05-24 ENCOUNTER — ANESTHESIA EVENT (INPATIENT)
Dept: PERIOP | Facility: HOSPITAL | Age: 21
DRG: 098 | End: 2021-05-24
Payer: COMMERCIAL

## 2021-05-24 ENCOUNTER — TELEPHONE (OUTPATIENT)
Dept: UROLOGY | Facility: CLINIC | Age: 21
End: 2021-05-24

## 2021-05-24 PROBLEM — N48.30 PRIAPISM: Status: ACTIVE | Noted: 2021-05-24

## 2021-05-24 LAB
ANION GAP SERPL CALCULATED.3IONS-SCNC: 13 MMOL/L (ref 4–13)
ARTERIAL PATENCY WRIST A: YES
BASE EXCESS BLDA CALC-SCNC: 4.3 MMOL/L
BASOPHILS # BLD AUTO: 0.02 THOUSANDS/ΜL (ref 0–0.1)
BASOPHILS NFR BLD AUTO: 0 % (ref 0–1)
BUN SERPL-MCNC: 13 MG/DL (ref 5–25)
CALCIUM SERPL-MCNC: 8.2 MG/DL (ref 8.3–10.1)
CHLORIDE SERPL-SCNC: 106 MMOL/L (ref 100–108)
CO2 SERPL-SCNC: 23 MMOL/L (ref 21–32)
CREAT SERPL-MCNC: 0.89 MG/DL (ref 0.6–1.3)
EOSINOPHIL # BLD AUTO: 0 THOUSAND/ΜL (ref 0–0.61)
EOSINOPHIL NFR BLD AUTO: 0 % (ref 0–6)
ERYTHROCYTE [DISTWIDTH] IN BLOOD BY AUTOMATED COUNT: 11.9 % (ref 11.6–15.1)
GFR SERPL CREATININE-BSD FRML MDRD: 124 ML/MIN/1.73SQ M
GLUCOSE SERPL-MCNC: 140 MG/DL (ref 65–140)
HCO3 BLDA-SCNC: 27.6 MMOL/L (ref 22–28)
HCT VFR BLD AUTO: 36.1 % (ref 36.5–49.3)
HGB BLD-MCNC: 12.4 G/DL (ref 12–17)
HOROWITZ INDEX BLDA+IHG-RTO: 30 MM[HG]
IMM GRANULOCYTES # BLD AUTO: 0.09 THOUSAND/UL (ref 0–0.2)
IMM GRANULOCYTES NFR BLD AUTO: 1 % (ref 0–2)
LACTATE SERPL-SCNC: 1.2 MMOL/L (ref 0.5–2)
LYMPHOCYTES # BLD AUTO: 0.7 THOUSANDS/ΜL (ref 0.6–4.47)
LYMPHOCYTES NFR BLD AUTO: 4 % (ref 14–44)
MCH RBC QN AUTO: 29.5 PG (ref 26.8–34.3)
MCHC RBC AUTO-ENTMCNC: 34.3 G/DL (ref 31.4–37.4)
MCV RBC AUTO: 86 FL (ref 82–98)
MONOCYTES # BLD AUTO: 0.54 THOUSAND/ΜL (ref 0.17–1.22)
MONOCYTES NFR BLD AUTO: 3 % (ref 4–12)
NEUTROPHILS # BLD AUTO: 17.27 THOUSANDS/ΜL (ref 1.85–7.62)
NEUTS SEG NFR BLD AUTO: 92 % (ref 43–75)
NRBC BLD AUTO-RTO: 0 /100 WBCS
O2 CT BLDA-SCNC: 17.1 ML/DL (ref 16–23)
OXYHGB MFR BLDA: 98.4 % (ref 94–97)
PCO2 BLDA: 36.4 MM HG (ref 36–44)
PEEP RESPIRATORY: 6 CM[H2O]
PH BLDA: 7.5 [PH] (ref 7.35–7.45)
PLATELET # BLD AUTO: 354 THOUSANDS/UL (ref 149–390)
PMV BLD AUTO: 9.4 FL (ref 8.9–12.7)
PO2 BLDA: 142.2 MM HG (ref 75–129)
POTASSIUM SERPL-SCNC: 4.2 MMOL/L (ref 3.5–5.3)
PROCALCITONIN SERPL-MCNC: 0.21 NG/ML
PROCALCITONIN SERPL-MCNC: <0.05 NG/ML
RBC # BLD AUTO: 4.2 MILLION/UL (ref 3.88–5.62)
SODIUM SERPL-SCNC: 142 MMOL/L (ref 136–145)
SPECIMEN SOURCE: ABNORMAL
VENT AC: 12
VENT- AC: AC
VT SETTING VENT: 500 ML
WBC # BLD AUTO: 18.62 THOUSAND/UL (ref 4.31–10.16)

## 2021-05-24 PROCEDURE — 94760 N-INVAS EAR/PLS OXIMETRY 1: CPT

## 2021-05-24 PROCEDURE — 80048 BASIC METABOLIC PNL TOTAL CA: CPT | Performed by: SURGERY

## 2021-05-24 PROCEDURE — 94150 VITAL CAPACITY TEST: CPT

## 2021-05-24 PROCEDURE — 99254 IP/OBS CNSLTJ NEW/EST MOD 60: CPT | Performed by: UROLOGY

## 2021-05-24 PROCEDURE — 94762 N-INVAS EAR/PLS OXIMTRY CONT: CPT

## 2021-05-24 PROCEDURE — 85025 COMPLETE CBC W/AUTO DIFF WBC: CPT | Performed by: SURGERY

## 2021-05-24 PROCEDURE — 83605 ASSAY OF LACTIC ACID: CPT | Performed by: SURGERY

## 2021-05-24 PROCEDURE — 82805 BLOOD GASES W/O2 SATURATION: CPT | Performed by: PHYSICIAN ASSISTANT

## 2021-05-24 PROCEDURE — 54220 IRRG CRPRA CAVRNOSA PRIAPISM: CPT | Performed by: UROLOGY

## 2021-05-24 PROCEDURE — NC001 PR NO CHARGE: Performed by: PHYSICIAN ASSISTANT

## 2021-05-24 PROCEDURE — 99291 CRITICAL CARE FIRST HOUR: CPT | Performed by: SURGERY

## 2021-05-24 PROCEDURE — 0V9SXZZ DRAINAGE OF PENIS, EXTERNAL APPROACH: ICD-10-PCS | Performed by: DENTIST

## 2021-05-24 PROCEDURE — 84145 PROCALCITONIN (PCT): CPT | Performed by: SURGERY

## 2021-05-24 PROCEDURE — 36600 WITHDRAWAL OF ARTERIAL BLOOD: CPT

## 2021-05-24 RX ORDER — LIDOCAINE HYDROCHLORIDE 10 MG/ML
INJECTION, SOLUTION EPIDURAL; INFILTRATION; INTRACAUDAL; PERINEURAL
Status: COMPLETED
Start: 2021-05-24 | End: 2021-05-24

## 2021-05-24 RX ORDER — SODIUM CHLORIDE, SODIUM GLUCONATE, SODIUM ACETATE, POTASSIUM CHLORIDE, MAGNESIUM CHLORIDE, SODIUM PHOSPHATE, DIBASIC, AND POTASSIUM PHOSPHATE .53; .5; .37; .037; .03; .012; .00082 G/100ML; G/100ML; G/100ML; G/100ML; G/100ML; G/100ML; G/100ML
1000 INJECTION, SOLUTION INTRAVENOUS ONCE
Status: DISCONTINUED | OUTPATIENT
Start: 2021-05-24 | End: 2021-05-24

## 2021-05-24 RX ORDER — FENTANYL CITRATE 50 UG/ML
75 INJECTION, SOLUTION INTRAMUSCULAR; INTRAVENOUS
Status: DISCONTINUED | OUTPATIENT
Start: 2021-05-24 | End: 2021-05-24

## 2021-05-24 RX ORDER — FENTANYL CITRATE 50 UG/ML
50 INJECTION, SOLUTION INTRAMUSCULAR; INTRAVENOUS
Status: DISCONTINUED | OUTPATIENT
Start: 2021-05-24 | End: 2021-05-25

## 2021-05-24 RX ADMIN — CHLORHEXIDINE GLUCONATE 0.12% ORAL RINSE 15 ML: 1.2 LIQUID ORAL at 21:47

## 2021-05-24 RX ADMIN — Medication 50 MCG/HR: at 13:20

## 2021-05-24 RX ADMIN — SODIUM CHLORIDE 3 G: 9 INJECTION, SOLUTION INTRAVENOUS at 17:55

## 2021-05-24 RX ADMIN — FENTANYL CITRATE 50 MCG: 50 INJECTION INTRAMUSCULAR; INTRAVENOUS at 19:34

## 2021-05-24 RX ADMIN — SODIUM CHLORIDE 3 G: 9 INJECTION, SOLUTION INTRAVENOUS at 05:58

## 2021-05-24 RX ADMIN — CHLORHEXIDINE GLUCONATE 0.12% ORAL RINSE 15 ML: 1.2 LIQUID ORAL at 08:09

## 2021-05-24 RX ADMIN — SODIUM CHLORIDE 3 G: 9 INJECTION, SOLUTION INTRAVENOUS at 11:52

## 2021-05-24 RX ADMIN — SODIUM CHLORIDE, SODIUM GLUCONATE, SODIUM ACETATE, POTASSIUM CHLORIDE, MAGNESIUM CHLORIDE, SODIUM PHOSPHATE, DIBASIC, AND POTASSIUM PHOSPHATE 1000 ML: .53; .5; .37; .037; .03; .012; .00082 INJECTION, SOLUTION INTRAVENOUS at 04:21

## 2021-05-24 RX ADMIN — LIDOCAINE HYDROCHLORIDE: 10 INJECTION, SOLUTION EPIDURAL; INFILTRATION; INTRACAUDAL; PERINEURAL at 11:51

## 2021-05-24 RX ADMIN — PROPOFOL 40 MCG/KG/MIN: 10 INJECTION, EMULSION INTRAVENOUS at 19:07

## 2021-05-24 RX ADMIN — PROPOFOL 50 MCG/KG/MIN: 10 INJECTION, EMULSION INTRAVENOUS at 22:50

## 2021-05-24 RX ADMIN — ENOXAPARIN SODIUM 40 MG: 40 INJECTION SUBCUTANEOUS at 08:09

## 2021-05-24 RX ADMIN — SODIUM CHLORIDE 3 G: 9 INJECTION, SOLUTION INTRAVENOUS at 00:29

## 2021-05-24 RX ADMIN — SODIUM CHLORIDE 3 G: 9 INJECTION, SOLUTION INTRAVENOUS at 23:59

## 2021-05-24 RX ADMIN — SODIUM CHLORIDE, SODIUM GLUCONATE, SODIUM ACETATE, POTASSIUM CHLORIDE, MAGNESIUM CHLORIDE, SODIUM PHOSPHATE, DIBASIC, AND POTASSIUM PHOSPHATE 75 ML/HR: .53; .5; .37; .037; .03; .012; .00082 INJECTION, SOLUTION INTRAVENOUS at 03:59

## 2021-05-24 RX ADMIN — SODIUM CHLORIDE, SODIUM GLUCONATE, SODIUM ACETATE, POTASSIUM CHLORIDE, MAGNESIUM CHLORIDE, SODIUM PHOSPHATE, DIBASIC, AND POTASSIUM PHOSPHATE 75 ML/HR: .53; .5; .37; .037; .03; .012; .00082 INJECTION, SOLUTION INTRAVENOUS at 11:50

## 2021-05-24 RX ADMIN — FENTANYL CITRATE 50 MCG: 50 INJECTION INTRAMUSCULAR; INTRAVENOUS at 15:59

## 2021-05-24 RX ADMIN — PROPOFOL 35 MCG/KG/MIN: 10 INJECTION, EMULSION INTRAVENOUS at 04:40

## 2021-05-24 RX ADMIN — PROPOFOL 50 MCG/KG/MIN: 10 INJECTION, EMULSION INTRAVENOUS at 00:29

## 2021-05-24 RX ADMIN — FENTANYL CITRATE 50 MCG: 50 INJECTION INTRAMUSCULAR; INTRAVENOUS at 04:22

## 2021-05-24 RX ADMIN — PROPOFOL 40 MCG/KG/MIN: 10 INJECTION, EMULSION INTRAVENOUS at 09:48

## 2021-05-24 NOTE — CASE MANAGEMENT
PT IS NOT A READMISSION  PT IS NOT IDENTIFIED AS A BUNDLE  READMISSION RISK SCORE OF 6    CM met with Pt, his spouse Micky, father-n-law Nakul Headley, [employer] with an introduction and explanation of role  Pt present alert and oriented and was able to assist with answering some the questing with head moving and his fingers  Micky reported the Pt resides with her, his step father, brother, sister and nephew in a 2 story home with no use of DME PTA and 2 steps to enter the home  Pt was reported being independent with ADLs, is unemployed with no hx of [facility] services, SNF, mental health or drug/alcohol placements  Pt was reported not having a living will, uses [employer] in [place] and [facility]  Family to transport upon d c,    CM reviewed d/c planning process including the following: identifying help at home, patient preference for d/c planning needs, Discharge Lounge, [facility] Meds to Bed program, availability of treatment team to discuss questions or concerns patient and/or family may have regarding understanding medications and recognizing signs and symptoms once discharged  CM also encouraged patient to follow up with all recommended appointments after discharge  Patient advised of importance for patient and family to participate in managing patients medical well being

## 2021-05-24 NOTE — TELEPHONE ENCOUNTER
Patient currently admitted    Patient tentatively scheduled 6/22/2021 at 945am at the [facility] office     Office to call and confirm upon discharge

## 2021-05-24 NOTE — CONSULTS
Juaquinalessandra  Progress Note Steffany Camacho [facility] EMERGENCY [facility] CENTER 2000, 24 y o  male MRN: [id number]  Unit/Bed#: ICU 11 Encounter: [id number]  Primary Care Provider: Landon Conner DO   Date and time admitted to hospital: 5/23/2021 10:02 AM    Airway compromise  Assessment & Plan  · Discussion was had with the patient prior to the procedure that he way require tracheostomy and risk of prolonged intubation  · Patient left nasally intubated post-operatively 2/2 to concern for airway compromise in the setting of dental abscess with localized swelling  · Vent settings: AC/VC 12/500/6/40%  · Sedation: propofol and fentanyl to maintain RASS -1  · Maintain back-up airway supplies at bedside  · Consider steroids if persistent swelling in the area    * Dental abscess  Assessment & Plan  · Patient has been having dental pain over the past 2 weeks and has been seen multiple times in the ED for dental abscess  Was treated outpatient with oral abx with no improvement in symtpoms  · [facility] on 5/22 and started on Unasyn   OMS recommended transfer to [facility] for concern of Saul's Angina  · Patient now s/p operative I&D of dental abscess and extraction of teeth 30,31, & 32 by OMS  · Dental abscess extended into R sublingual, submandibular, submasseteric, and subpyerygomandibular spaces  · Penrose drain in place  · Patient left nasally intubated post-operatively given risk for airway compromise  · Continue Unasyn per OMS recommendations  · Follow-up blood cultures and wound cultures  · Continue maintenance IVF while NPO  · Airway management as described below  · Pain control with fentanyl gtt and PRN fentanyl while intubated  · Monitor fever curve and WBC count        -------------------------------------------------------------------------------------------------------------  Chief Complaint: Dental pain    History of Present Illness   HX and PE limited by: intubation/sedation  Mary Castañeda is a 24 y o  male with no significant PMHx who [facility] with complaints of persistent dental pain  The patient had been seen numerous times in the ED for R dental abscess which was being treated outpatient with oral antibiotics without improvement in symptoms  There was concern for progression of the abscess to Saul's Angina and so the patient was started on Unasyn and transferred to [facility]  Patient was taken for operative I&D of the abscess and teeth extraction by OMS today and left nasally intubated 2/2 to concern for airway compromise  Patient comes to the ICU hemodynamically stable, intubated on AC/VC 12/500/6/40%, and sedated on propofol  History obtained from chart review  -------------------------------------------------------------------------------------------------------------  Dispo: Admit to Critical Care     Code Status: Level 1 - Full Code  --------------------------------------------------------------------------------------------------------------  Review of Systems   Unable to perform ROS: Intubated       Review of systems was unable to be performed secondary to intubated/sedated    Physical Exam  Vitals signs reviewed  Constitutional:       Interventions: He is sedated, intubated and restrained  HENT:      Head: Normocephalic and atraumatic  Mouth/Throat:      Mouth: Mucous membranes are moist    Eyes:      Pupils: Pupils are equal, round, and reactive to light  Cardiovascular:      Rate and Rhythm: Normal rate and regular rhythm  Pulses: Normal pulses  Dorsalis pedis pulses are 2+ on the right side and 2+ on the left side  Posterior tibial pulses are 2+ on the right side and 2+ on the left side  Heart sounds: Normal heart sounds  Pulmonary:      Effort: Pulmonary effort is normal  He is intubated  Breath sounds: Normal breath sounds  No wheezing, rhonchi or rales  Abdominal:      General: Abdomen is flat   There is no distension  Palpations: Abdomen is soft  Musculoskeletal:      Right lower leg: No edema  Left lower leg: No edema  Skin:     General: Skin is warm and dry  Capillary Refill: Capillary refill takes less than 2 seconds  Neurological:      Comments: Sedated       --------------------------------------------------------------------------------------------------------------  Vitals:   Vitals:    05/23/21 1723 05/23/21 1900 05/23/21 1912 05/23/21 2000   BP:  107/56  130/61   BP Location:  Left arm     Pulse: 93 71  73   Resp: 20 14  12   Temp:  99 2 °F (37 3 °C)     TempSrc:  Temporal     SpO2: 99% 99% 99% 100%     Temp  Min: 98 8 °F (37 1 °C)  Max: 99 5 °F (37 5 °C)        There is no height or weight on file to calculate BMI  Laboratory and Diagnostics:  Results from last 7 days   Lab Units 05/22/21  2256   WBC Thousand/uL 16 03*   HEMOGLOBIN g/dL 14 2   HEMATOCRIT % 40 1   PLATELETS Thousands/uL 433*   NEUTROS PCT % 82*   MONOS PCT % 5     Results from last 7 days   Lab Units 05/22/21  2256   SODIUM mmol/L 140   POTASSIUM mmol/L 3 8   CHLORIDE mmol/L 102   CO2 mmol/L 28   ANION GAP mmol/L 10   BUN mg/dL 13   CREATININE mg/dL 1 12   CALCIUM mg/dL 9 3   GLUCOSE RANDOM mg/dL 91                       ABG:    VBG:          Micro:  Results from last 7 days   Lab Units 05/23/21  0122   BLOOD CULTURE  Received in Microbiology Lab  Culture in Progress  Received in Microbiology Lab  Culture in Progress  Historical Information   Past Medical History:   Diagnosis Date    Asthma      History reviewed  No pertinent surgical history  Social History   Social History     Substance and Sexual Activity   Alcohol Use Never    Frequency: Never     Social History     Substance and Sexual Activity   Drug Use Never     Social History     Tobacco Use   Smoking Status Never Smoker   Smokeless Tobacco Never Used       Family History:   History reviewed  No pertinent family history    I have reviewed this patient's family history and commented on sigificant items within the HPI      Medications:  Current Facility-Administered Medications   Medication Dose Route Frequency    acetaminophen (TYLENOL) tablet 650 mg  650 mg Oral Q6H PRN    ampicillin-sulbactam (UNASYN) 3 g in sodium chloride 0 9 % 100 mL IVPB  3 g Intravenous Q6H    chlorhexidine (PERIDEX) 0 12 % oral rinse 15 mL  15 mL Mouth/Throat Q12H [address]    [START ON 5/24/2021] enoxaparin (LOVENOX) subcutaneous injection 40 mg  40 mg Subcutaneous Q24H WILBERTO    fentaNYL 1000 mcg in sodium chloride 0 9% 100mL infusion  50 mcg/hr Intravenous Continuous    fentanyl citrate (PF) 100 MCG/2ML 50 mcg  50 mcg Intravenous Q1H PRN    multi-electrolyte (PLASMALYTE-A/ISOLYTE-S PH 7 4) IV solution  75 mL/hr Intravenous Continuous    ondansetron (ZOFRAN) injection 4 mg  4 mg Intravenous Q6H PRN    propofol (DIPRIVAN) 1000 mg in 100 mL infusion (premix)  5-50 mcg/kg/min Intravenous Titrated     Home medications:  Prior to Admission Medications   Prescriptions Last Dose Informant Patient Reported?  Taking?   naproxen (NAPROSYN) 500 mg tablet   No No   Sig: Take 1 tablet (500 mg total) by mouth 2 (two) times a day with meals   naproxen (NAPROSYN) 500 mg tablet   No No   Sig: Take 1 tablet (500 mg total) by mouth 2 (two) times a day with meals for 10 doses   penicillin V potassium (VEETID) 500 mg tablet   Yes No   Sig: Take 500 mg by mouth every 6 (six) hours      Facility-Administered Medications: None     Allergies:  No Known Allergies  ------------------------------------------------------------------------------------------------------------  Advance Directive and Living Will:      Power of :    POLST:    ------------------------------------------------------------------------------------------------------------  Care Time Delivered:   No Critical Care time spent       Lebron Ferguson PA-C        Portions of the record may have been created with voice recognition software  Occasional wrong word or "sound a like" substitutions may have occurred due to the inherent limitations of voice recognition software    Read the chart carefully and recognize, using context, where substitutions have occurred

## 2021-05-24 NOTE — UTILIZATION REVIEW
Initial Clinical Review    Admission: Date/Time/Statement:   Admission Orders (From admission, onward)     Ordered        05/23/21 1058  Inpatient Admission  Once                   Orders Placed This Encounter   Procedures    Inpatient Admission     Standing Status:   Standing     Number of Occurrences:   1     Order Specific Question:   Level of Care     Answer:   Med Surg [16]     Order Specific Question:   Estimated length of stay     Answer:   More than 2 Midnights     Order Specific Question:   Certification     Answer:   I certify that inpatient services are medically necessary for this patient for a duration of greater than two midnights  See H&P and MD Progress Notes for additional information about the patient's course of treatment  Initial Presentation: 24year old male directly admitted from [place] [address] ED to [facility] inpatient bed for dental abscess, leukocytosis  He has had right sided dental pain for 3 weeks with 4 prior visits to the ED, failed outpatient abx  CT shows abscess and there was concern for ludwigs angina therefore he was transferred for OMFS eval    Started on IV abx in the ED  NPO  Started on IV fluids  OMFS consult  5/23 OMFS: He has significant right mandibular swelling with overlying erythema and trismus  Plan for OR  OPERATIVE NOTE  SURGERY DATE: 5/23/2021  Procedure(s) (LRB):  INCISION AND DRAINAGE (I&D) HEAD/FACE, extraction of teeth 30, 31, 32  incision and drainage right submandibular, right sublingual, right submasseteric, and right pterygomandibular space infection (Right)  Operative Findings:  Complete bony impaction tooth number 32  Gross decay teeth #30 and #31    5/23 Update:  Postoperatively, remains nasally intubated for concern of airway compromise due to localized swelling  Placed in ICU for monitoring  Sedated with propofol, fentanyl  Continue with IV abx, IV fluids  Penrose drain in place  WBCs elevated     Date: 5/24   Day 2: Remains intubated  And sedated with   AC/VC 12/500/6/40%  Continue with iv fluids, iv abx  Operative cultures pending  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  Zelaya in place  Urology consult  5/24 Urology consult: INtubated and sedated  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  No H/O  Patient with priapism, Zelaya catheter in place, there is some pre-existing skin breakdown underneath the shaft of the penis at the penoscrotal junction representing some ulceration prior to manipulation today  OPERATIVE NOTE:  DATE OF PROCEDURE:   5/24/2021  PROCEDURES PERFORMED:  1) dorsal nerve block of penis  2) percutaneous drainage of priapism  OVERALL IMPRESSION:  Successful drainage of priapism, estimated blood loss is around 100 milliliters, a total of 300 micrograms of phenylephrine was used, patient was in a monitored setting in the intensive care unit without hypertension  Patient had a bradycardic heart rate at baseline       Temperature Pulse Respirations Blood Pressure SpO2   05/23/21 1500 05/23/21 1500 05/23/21 1500 05/23/21 1500 05/23/21 1500   99 5 °F (37 5 °C) 98 15 129/70 98 %      Temp Source Heart Rate Source Patient Position - Orthostatic VS BP Location FiO2 (%)   05/23/21 1500 05/23/21 1657 05/23/21 1500 05/23/21 1500 05/24/21 0739   Axillary Monitor Lying Left arm 40      Pain Score       05/23/21 1003       Worst Possible Pain          Wt Readings from Last 1 Encounters:   05/24/21 93 9 kg (207 lb 0 2 oz) (94 %, Z= 1 59)*     * Growth percentiles are based on CDC (Boys, 2-20 Years) data       Additional Vital Signs:   Date/Time  Temp Pulse  Resp  BP  MAP (mmHg)  SpO2  FiO2 (%)  O2 Device  Patient Position - Orthostatic VS   05/24/21 0900  98 1 °F (36 7 °C) 42Abnormal   12  106/53  77  99 %  --  --  --   05/24/21 0800  -- 43Abnormal   12  102/51  74  99 %  --  --  --   05/24/21 0739  -- --  --  --  --  99 %  40  Ventilator  --   05/24/21 0600  -- 44Abnormal   12  97/50  72  99 %  -- --  --   05/24/21 0500  -- 46Abnormal   12  95/50  71  99 %  --  --  --   05/24/21 0320  98 4 °F (36 9 °C) 45Abnormal   12  101/50  70  100 %  --  --  Lying   05/24/21 0300  -- 50Abnormal   12  100/55  73  99 %  --  --  --   05/24/21 0200  -- 57  12  96/50  70  99 %  --  --  --   05/24/21 0100  -- 57  12  94/54  70  99 %  --  --  --   05/24/21 0000  -- 64  12  95/54  71  99 %  --  --  --   05/23/21 2305  97 7 °F (36 5 °C) 63  12  103/52  74  99 %  --  --  Lying   05/23/21 2303  -- --  --  --  --  100 %  --  --  --   05/23/21 2200  -- 67  12  108/51  75  99 %  --  --  --   05/23/21 2100  -- 69  12  120/57  80  99 %  --  --  --   05/23/21 2000  -- 73  12  130/61  88  100 %  --  --  --   05/23/21 1912  -- --  --  --  --  99 %  --  --  --   05/23/21 1900  99 2 °F (37 3 °C) 71  14  107/56  77  99 %  --  --  Lying   05/23/21 1723  -- 93  20  --  --  99 %  --  None (Room air)  --   05/23/21 1657  99 5 °F (37 5 °C) 112Abnormal   19  117/72  --  98 %  --  None (Room air)  --   05/23/21 1500  99 5 °F (37 5 °C) 98  15  129/70  92  98 %             Pertinent Labs/Diagnostic Test Results:   5/24 CXR: ET tube tip is approximately 5-6 cm from the seth  No active pulmonary disease  5/23 CT soft tissue neck: Significant interval worsening in inflammatory change involving the right submandibular, sublingual regions and lateral floor of the the mouth with a new 3 6 x 2 6 x 4 2 cm abscess, when compared to a CT neck dated May 14, 2021   The findings are   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  Cloteal Ezio, the site infection appears to be related to a subcentimeter odontogenic abscess along the right mandible at the level of the 1st molar tooth  Patent airway         Results from last 7 days   Lab Units 05/24/21  0406 05/23/21  2142 05/23/21 2048 05/22/21  2256   WBC Thousand/uL 18 62*  --  23 65* 16 03*   HEMOGLOBIN g/dL 12 4  --  13 5 14 2   I STAT HEMOGLOBIN g/dl  --  12 2  --   --    HEMATOCRIT % 36 1*  --  40 0 40 1   HEMATOCRIT, ISTAT %  --  36*  --   --    PLATELETS Thousands/uL 354  --  396* 433*   NEUTROS ABS Thousands/µL 17 27*  --  22 22* 13 11*         Results from last 7 days   Lab Units 05/24/21  0406 05/23/21  2142 05/23/21 2048 05/22/21  2256   SODIUM mmol/L 142  --  140 140   POTASSIUM mmol/L 4 2  --  4 5 3 8   CHLORIDE mmol/L 106  --  105 102   CO2 mmol/L 23  --  24 28   CO2, I-STAT mmol/L  --  24  --   --    ANION GAP mmol/L 13  --  11 10   BUN mg/dL 13  --  10 13   CREATININE mg/dL 0 89  --  0 85 1 12   EGFR ml/min/1 73sq m 124  --  126 95   CALCIUM mg/dL 8 2*  --  8 6 9 3         Results from last 7 days   Lab Units 05/23/21  1552 05/23/21  1513   POC GLUCOSE mg/dl 110 65     Results from last 7 days   Lab Units 05/24/21  0406 05/23/21 2048 05/22/21  2256   GLUCOSE RANDOM mg/dL 140 109 91     Results from last 7 days   Lab Units 05/23/21 2142   I STAT BASE EXC mmol/L -1   I STAT O2 SAT % 100*   ISTAT PH ART  7 426   I STAT ART PCO2 mm HG 34 6*   I STAT ART PO2 mm  0*   I STAT ART HCO3 mmol/L 22 8     Results from last 7 days   Lab Units 05/23/21  2048   PROCALCITONIN ng/ml <0 05     Results from last 7 days   Lab Units 05/24/21  0406   LACTIC ACID mmol/L 1 2         Results from last 7 days   Lab Units 05/23/21  0122   BLOOD CULTURE  Received in Microbiology Lab  Culture in Progress  Received in Microbiology Lab  Culture in Progress           Past Medical History:   Diagnosis Date    Asthma      Present on Admission:   Dental abscess   Leukocytosis      Admitting Diagnosis: Dental abscess [K04 7]  Age/Sex: 24 y o  male  Admission Orders:  I/O  Reposition every 2 hours  ABD, procal,   NPO  Scheduled Medications:  ampicillin-sulbactam, 3 g, Intravenous, Q6H  chlorhexidine, 15 mL, Mouth/Throat, Q12H WILBERTO  enoxaparin, 40 mg, Subcutaneous, Q24H WILBERTO  lidocaine (PF), , ,       Continuous IV Infusions:  fentaNYL, 50 mcg/hr, Intravenous, Continuous  multi-electrolyte, 75 mL/hr, Intravenous, Continuous  propofol, 5-50 mcg/kg/min, Intravenous, Titrated      PRN Meds:  acetaminophen, 650 mg, Oral, Q6H PRN  fentanyl citrate (PF), 50 mcg, Intravenous, Q1H PRN  ondansetron, 4 mg, Intravenous, Q6H PRN  phenylephrine, 100 mcg, Intracavernosal, Q3 min PRN        IP CONSULT TO ORAL AND MAXILLOFACIAL SURGERY  IP CONSULT TO CASE MANAGEMENT  IP CONSULT TO UROLOGY    Network Utilization Review Department  ATTENTION: Please call with any questions or concerns to [phone] and carefully listen to the prompts so that you are directed to the right person  All voicemails are confidential   Rios Tiwari all requests for admission clinical reviews, approved or denied determinations and any other requests to dedicated fax number below belonging to the campus where the patient is receiving treatment   List of dedicated fax numbers for the Facilities:  [address] DENIALS (Administrative/Medical Necessity) [phone]   [address] (Maternity/NICU/Pediatrics) [phone]   [address] [place] [address] [address] [place] [address] [place] [address] [facility] [phone]

## 2021-05-24 NOTE — TELEPHONE ENCOUNTER
And this suggested sent to our office to arrange for follow-up for this patient for 3-4 weeks from now

## 2021-05-24 NOTE — UTILIZATION REVIEW
Inpatient Admission Authorization Request   NOTIFICATION OF INPATIENT ADMISSION/INPATIENT AUTHORIZATION REQUEST   SERVICING FACILITY:   [address]  Tax ID: [id number]  NPI: [id number]  Place of Service: Inpatient [address] [facility]  [address]  Place of Service Code: 24     ATTENDING PROVIDER:  Attending Name and NPI#: Joana Denney [[id number]]  Address: [address]  Phone: [phone]     UTILIZATION REVIEW CONTACT:  Ken Newman Utilization   Network Utilization Review Department  Phone: [phone]  Fax: [phone]  Email: [email or web address]     PHYSICIAN ADVISORY SERVICES:  FOR JUDC-BY-KUUQ REVIEW - MEDICAL NECESSITY DENIAL  Phone: [phone]  Fax: [phone]  Email: [email or web address]     TYPE OF REQUEST:  Inpatient Status     ADMISSION INFORMATION:  ADMISSION DATE/TIME: 5/23/21 1002  PATIENT DIAGNOSIS CODE/DESCRIPTION:  Dental abscess [K04 7]  DISCHARGE DATE/TIME: No discharge date for patient encounter  DISCHARGE DISPOSITION (IF DISCHARGED): [address] [facility]     IMPORTANT INFORMATION:  Please contact the Ken Newman directly with any questions or concerns regarding this request  Department voicemails are confidential     Send requests for admission clinical reviews, concurrent reviews, approvals, and administrative denials due to lack of clinical to fax [phone]

## 2021-05-24 NOTE — PROCEDURES
Operative Note     PATIENT:  Jaelyn Hernandez (MRN [id number])    DATE OF PROCEDURE:   5/24/2021    PRE-OP DIAGNOSIS:   1) priapism      POST-OP DIAGNOSIS:   1) priapism    PROCEDURES PERFORMED:  1) dorsal nerve block of penis  2) percutaneous drainage of priapism    SURGEON:  Lissa Mazariegos MD    ASSISTANTS:  Alejandro Saldaña MS3    NOTE:  There were no qualified teaching residents to assist with this case    ANESTHESIA: General     COMPLICATIONS:   None    ANTIBIOTICS:  None, clean case    INTRAOPERATIVE THROMBOEMBOLISM PROPHYLAXIS:  Pneumatic compression stockings     OVERALL IMPRESSION:  Successful drainage of priapism, estimated blood loss is around 100 milliliters, a total of 300 micrograms of phenylephrine was used, patient was in a monitored setting in the intensive care unit without hypertension  Patient had a bradycardic heart rate at baseline    INDICATIONS FOR PROCEDURE:  Jaelyn Hernandez is an 24 y o  old male with a spontaneous priapism, unknown cause, he is admitted to hospital for a complicated infection of the head and neck/teeth, status post debridement and removal of infected teeth  Urology was consulted for this patient and evaluated the patient as quickly and as safely as possible  Examination did show a priapism with Zelaya catheter in good position  Consent was obtained by the patient's wife by way of a telephone consent which was witnessed as well    PROCEDURE IN DETAIL:   The patient was evaluated and treated in the intensive care unit, bed 11, he was prepared and draped in usual fashion with use of iodine  Prior to this the pharmacy had mixed up aliquots of 100 micrograms of phenylephrine for use for deflation of priapism      A dorsal nerve block was performed with plain lidocaine, a small area of infiltration was also performed at the 9 o'clock position of the penis roughly 2 centimeters from the base    A 18 gauge needle was placed through the left glans into the tip of the erect left-sided corpus cavernosum with drainage of blood under high pressure being dark red/black in color  A 2nd 18 gauge needle was placed at the base the penis on the patient's right-hand side, roughly 2 centimeters from the penoscrotal junction  The patient was noted to have some superficial ulcerations at the penoscrotal junction which were present prior to any urologic manipulation today  A total of roughly 100 milliliters of blood was drained from the penis, aliquots of 100 micrograms of phenylephrine were then administered sequentially while watching the patient's vital signs, the patient was seen to obtain a flexible and flaccid penis without repeat priapism or erection    All needle and instrument counts were correct  The patient was cleansed of the iodine prep and blood, a dressing was then placed, he remains flaccid at the end of today's procedure  PLAN:  The patient remains under the excellent care of the critical care team, I have discussed this case with Dr Beryle Kansas who was on-call for the [facility]  The patient's family will be updated shortly  Should his priapism recurred he may end up requiring a shunt    I am hopeful that this will not be necessary given postprocedural findings today

## 2021-05-24 NOTE — CONSULTS
UROLOGY CONSULTATION NOTE     Patient Identifiers: Farzad Cavazos (MRN [id number])  Service Requesting Consultation:  ICU  Service Providing Consultation:  Urology, Lenore Reed MD    Date of Service: 5/24/2021  Consults    Reason for Consultation:  Priapism    History of Present Illness:     Farzad Cavazos is a 24 y o  old with a history of poor dentition, oral infection, status post incision and debridement and of teeth, seen to have priapism this morning, urology was called and we evaluated the patient as quickly and as safely as we could arrive  Examination shows a patient that is intubated and sedated, does not appear to respond to voice stimulus, indeed physical exam does show a priapism with a flaccid glans penis and rigid corpora cavernosa  The patient is not able to provide me any other history  In speaking with his wife he has not had a priapism previously and does not take any substances or supplements and has no family history of this ailment  The following portions of the patient's history were reviewed and updated as appropriate: allergies, current medications, past family history, past medical history, past social history, past surgical history and problem list         Past Medical, Past Surgical History:     Past Medical History:   Diagnosis Date    Asthma    :    History reviewed   No pertinent surgical history :    Medications, Allergies:     Current Facility-Administered Medications   Medication Dose Route Frequency    acetaminophen (TYLENOL) tablet 650 mg  650 mg Oral Q6H PRN    ampicillin-sulbactam (UNASYN) 3 g in sodium chloride 0 9 % 100 mL IVPB  3 g Intravenous Q6H    chlorhexidine (PERIDEX) 0 12 % oral rinse 15 mL  15 mL Mouth/Throat Q12H WILBERTO    enoxaparin (LOVENOX) subcutaneous injection 40 mg  40 mg Subcutaneous Q24H WILBERTO    fentaNYL 1000 mcg in sodium chloride 0 9% 100mL infusion  50 mcg/hr Intravenous Continuous    fentanyl citrate (PF) 100 MCG/2ML 50 mcg  50 mcg Intravenous Q1H PRN    lidocaine (PF) (XYLOCAINE-MPF) 1 % injection **ADS Override Pull**        multi-electrolyte (PLASMALYTE-A/ISOLYTE-S PH 7 4) IV solution  75 mL/hr Intravenous Continuous    ondansetron (ZOFRAN) injection 4 mg  4 mg Intravenous Q6H PRN    phenylephrine (CLAUDIA-SYNEPHRINE) 100 mcg in sodium chloride 0 9% FOR PRIAPISM  100 mcg Intracavernosal Q3 min PRN    propofol (DIPRIVAN) 1000 mg in 100 mL infusion (premix)  5-50 mcg/kg/min Intravenous Titrated       Allergies:  No Known Allergies:    Social and Family History:   Social History:   Social History     Tobacco Use    Smoking status: Never Smoker    Smokeless tobacco: Never Used   Substance Use Topics    Alcohol use: Never     Frequency: Never    Drug use: Never     Social History     Tobacco Use   Smoking Status Never Smoker   Smokeless Tobacco Never Used       Family History:  History reviewed  No pertinent family history :     Review of Systems:     Unable to perform, intubated and sedated    Physical Exam:   /52   Pulse (!) 42   Temp 97 7 °F (36 5 °C)   Resp 12   Wt 93 9 kg (207 lb 0 2 oz)   SpO2 99%   BMI 28 87 kg/m² Temp (24hrs), Av 5 °F (36 9 °C), Min:97 7 °F (36 5 °C), Max:99 5 °F (37 5 °C)  current; Temperature: 97 7 °F (36 5 °C)  I/O last 24 hours: In: 2590 8 [I V :2590 8]  Out: 700 [Urine:700]  General:  The patient is intubated and sedated, the breathing tube is going through the right naris    Cardiac: Peripheral edema: negative, peripheral pulses are present and indicated a bradycardic rate and rhythm    Pulmonary:  Mechanically ventilated    Abdomen: Soft, non-tender, non-distended       Genitourinary:  Patient with priapism, Zelaya catheter in place, there is some pre-existing skin breakdown underneath the shaft of the penis at the penoscrotal junction representing some ulceration prior to manipulation today    Neurological:  Unable to perform, patient is intubated and sedated    Musculoskeletal: Extremities are warm, ROM is not assessed    Psychiatric:  Unable to perform, patient intubated and sedated    Lymphatic: There is not adenopathy in the inguinal region  Skin:  Some skin breakdown underneath the penis at the penoscrotal junction, representing ulceration    MONTANEZ: in place draining clear yellow urine        Labs:     Lab Results   Component Value Date    HGB 12 4 05/24/2021    HCT 36 1 (L) 05/24/2021    WBC 18 62 (H) 05/24/2021     05/24/2021   ]    Lab Results   Component Value Date    K 4 2 05/24/2021     05/24/2021    CO2 23 05/24/2021    BUN 13 05/24/2021    CREATININE 0 89 05/24/2021    CALCIUM 8 2 (L) 05/24/2021    GLUCOSE 115 05/23/2021   ]    Imaging:   I personally reviewed the images and report of the following studies, and reviewed them with the patient:    No urologic imaging for my review      ASSESSMENT:     24 y o  old male with  priapism, unknown source  Has reportedly not had this previously with review with his wife  Telephone consent was obtained for a bedside drainage of priapism, after discussion of the pathophysiology of priapism with the wife and wife's stepmother  All questions and concerns were answered and addressed  Please see separate procedure note for details  Successful drainage of priapism, 300 micrograms of phenylephrine were administered and the patient is flaccid at the end of our procedure  PLAN:     Continued care per primary team     Case discussed with Dr Rufus Orosco who is on-call for Urology for the [facility]  We will arrange for follow-up in the office in a number of weeks to assess sexual function and healing    Portions of the above record have been created with voice recognition software  Occasional wrong word or "sound alike" substitution may have occurred due to the inherent limitations of voice recognition software  Read the chart carefully and recognize, using context, where substitution may have occurred      Thank you for allowing me to participate in this patients care  Please do not hesitate to call with any additional questions    Romi Velasquez MD       ]

## 2021-05-24 NOTE — QUICK NOTE
Attempted to call and update patient's spouse  No answer  Left message  Addendum 1800: Was available to contact patient's wife  Questions answered         Maik Mon [phone]

## 2021-05-24 NOTE — PROGRESS NOTES
[facility]  Progress Note Sander Hodgkins [facility] EMERGENCY [facility] CENTER 2000, 24 y o  male MRN: [id number]  Unit/Bed#: ICU 11 Encounter: [id number]  Primary Care Provider: Heidi Saunders DO   Date and time admitted to hospital: 5/23/2021 10:02 AM    Airway compromise  Assessment & Plan  · Discussion was had with the patient prior to the procedure that he way require tracheostomy and risk of prolonged intubation  · Patient left nasally intubated post-operatively 2/2 to concern for airway compromise in the setting of dental abscess with localized swelling  · Vent settings: AC/VC 12/500/6/40%  · Sedation: propofol and fentanyl to maintain RASS -1  · Maintain back-up airway supplies at bedside  · Consider steroids if persistent swelling in the area    * Dental abscess  Assessment & Plan  · Patient has been having dental pain over the past 2 weeks and has been seen multiple times in the ED for dental abscess  Was treated outpatient with oral abx with no improvement in symtpoms  · Presented to [facility] on 5/22 and started on Unasyn  OMS recommended transfer to [facility] for concern of Saul's Angina  · Patient now s/p operative I&D of dental abscess and extraction of teeth 30,31, & 32 by OMS  · Dental abscess extended into R sublingual, submandibular, submasseteric, and subpyerygomandibular spaces  · Penrose drain in place  · Patient left nasally intubated post-operatively given risk for airway compromise  · Continue Unasyn per OMS recommendations  · Follow-up blood cultures and wound cultures  · Continue maintenance IVF while NPO  · Airway management as described below  · Pain control with fentanyl gtt and PRN fentanyl while intubated  · Monitor fever curve and WBC count        ----------------------------------------------------------------------------------------  HPI/24hr events: Patient POD 1 from I&D of dental abscess and teeth extraction  Remained intubated overnight on AC/VC 12/500/6/40%       Gtts: Fentanyl and propofol    Disposition: Continue Critical Care   Code Status: Level 1 - Full Code  ---------------------------------------------------------------------------------------  SUBJECTIVE  Intubated and sedated    Review of Systems   Unable to perform ROS: Intubated     Review of systems was unable to be performed secondary to intubation/sedation  ---------------------------------------------------------------------------------------  OBJECTIVE    Vitals   Vitals:    21 2303 21 2305 21 0000 21 0100   BP:  103/52 95/54 94/54   BP Location:  Left arm     Pulse:  63 64 57   Resp:  12 12 12   Temp:  97 7 °F (36 5 °C)     TempSrc:  Axillary     SpO2: 100% 99% 99% 99%     Temp (24hrs), Av 9 °F (37 2 °C), Min:97 7 °F (36 5 °C), Max:99 5 °F (37 5 °C)  Current: Temperature: 97 7 °F (36 5 °C)          Respiratory:  SpO2: SpO2: 99 %       Invasive/non-invasive ventilation settings   Respiratory    Lab Data (Last 4 hours)    None         O2/Vent Data (Last 4 hours)    None                Physical Exam  Vitals signs reviewed  Constitutional:       Interventions: He is sedated, intubated and restrained  HENT:      Head: Normocephalic and atraumatic  Mouth/Throat:      Mouth: Mucous membranes are moist    Eyes:      Pupils: Pupils are equal, round, and reactive to light  Cardiovascular:      Rate and Rhythm: Normal rate and regular rhythm  Pulses: Normal pulses  Dorsalis pedis pulses are 2+ on the right side and 2+ on the left side  Posterior tibial pulses are 2+ on the right side and 2+ on the left side  Heart sounds: Normal heart sounds  Pulmonary:      Effort: Pulmonary effort is normal  He is intubated  Breath sounds: Normal breath sounds  No wheezing, rhonchi or rales  Abdominal:      General: Abdomen is flat  There is no distension  Palpations: Abdomen is soft  Musculoskeletal:      Right lower leg: No edema        Left lower leg: No edema    Skin:     General: Skin is warm and dry  Capillary Refill: Capillary refill takes less than 2 seconds  Neurological:      Comments: Sedated         Laboratory and Diagnostics:  Results from last 7 days   Lab Units 05/23/21 2142 05/23/21 2048 05/22/21 2256   WBC Thousand/uL  --  23 65* 16 03*   HEMOGLOBIN g/dL  --  13 5 14 2   I STAT HEMOGLOBIN g/dl 12 2  --   --    HEMATOCRIT %  --  40 0 40 1   HEMATOCRIT, ISTAT % 36*  --   --    PLATELETS Thousands/uL  --  396* 433*   NEUTROS PCT %  --  93* 82*   MONOS PCT %  --  4 5     Results from last 7 days   Lab Units 05/23/21 2142 05/23/21 2048 05/22/21 2256   SODIUM mmol/L  --  140 140   POTASSIUM mmol/L  --  4 5 3 8   CHLORIDE mmol/L  --  105 102   CO2 mmol/L  --  24 28   CO2, I-STAT mmol/L 24  --   --    ANION GAP mmol/L  --  11 10   BUN mg/dL  --  10 13   CREATININE mg/dL  --  0 85 1 12   CALCIUM mg/dL  --  8 6 9 3   GLUCOSE RANDOM mg/dL  --  109 91                       ABG:    VBG:    Results from last 7 days   Lab Units 05/23/21 2048   PROCALCITONIN ng/ml <0 05       Micro  Results from last 7 days   Lab Units 05/23/21  0122   BLOOD CULTURE  Received in Microbiology Lab  Culture in Progress  Received in Microbiology Lab  Culture in Progress  Intake and Output  I/O       05/22 0701 - 05/23 0700 05/23 0701 - 05/24 0700    P  O   0    I V   1285 6    Total Intake  1285 6    Urine  0    Drains  0    Total Output  0    Net  +1285 6                Height and Weights         There is no height or weight on file to calculate BMI  Weight (last 2 days)     None            Nutrition       Diet Orders   (From admission, onward)             Start     Ordered    05/23/21 1908  Diet NPO  Diet effective now     Question Answer Comment   Diet Type NPO    RD to adjust diet per protocol?  Yes        05/23/21 1909 05/23/21 1154  Room Service  Once     Question:  Type of Service  Answer:  Room Service - Appropriate with Assistance    05/23/21 8323 Active Medications  Scheduled Meds:  Current Facility-Administered Medications   Medication Dose Route Frequency Provider Last Rate    acetaminophen  650 mg Oral Q6H PRN Ledon Presser, PA-C      ampicillin-sulbactam  3 g Intravenous Q6H Ledon Presser, PA-C 3 g (05/24/21 0029)    chlorhexidine  15 mL Mouth/Throat Q12H [facility]      enoxaparin  40 mg Subcutaneous Q24H [address] Ledon Presser, PA-C      fentaNYL  50 mcg/hr Intravenous Continuous Ledon Presser, PA-C 50 mcg/hr (05/23/21 1929)    fentanyl citrate (PF)  50 mcg Intravenous Q1H PRN Ledon Presser, PA-C      multi-electrolyte  75 mL/hr Intravenous Continuous Ledon Presser, PA-C 75 mL/hr (05/23/21 1930)    ondansetron  4 mg Intravenous Q6H PRN Ledon Presser, PA-C      propofol  5-50 mcg/kg/min Intravenous Titrated Ledon Presser, PA-C 50 mcg/kg/min (05/24/21 0029)     Continuous Infusions:  fentaNYL, 50 mcg/hr, Last Rate: 50 mcg/hr (05/23/21 1929)  multi-electrolyte, 75 mL/hr, Last Rate: 75 mL/hr (05/23/21 1930)  propofol, 5-50 mcg/kg/min, Last Rate: 50 mcg/kg/min (05/24/21 0029)      PRN Meds:   acetaminophen, 650 mg, Q6H PRN  fentanyl citrate (PF), 50 mcg, Q1H PRN  ondansetron, 4 mg, Q6H PRN        Invasive Devices Review  Invasive Devices     Peripheral Intravenous Line            Peripheral IV 05/22/21 Right Antecubital 1 day    Peripheral IV 05/23/21 Left Antecubital less than 1 day          Drain            Open Drain Right Neck less than 1 day          Airway            ETT  Cuffed;Oral 7 mm less than 1 day                ---------------------------------------------------------------------------------------  Advance Directive and Living Will:      Power of :    POLST:    ---------------------------------------------------------------------------------------  Care Time Delivered:   No Critical Care time spent       Chris Wu PA-C      Portions of the record may have been created with voice recognition software    Occasional wrong word or "sound a like" substitutions may have occurred due to the inherent limitations of voice recognition software    Read the chart carefully and recognize, using context, where substitutions have occurred

## 2021-05-24 NOTE — ASSESSMENT & PLAN NOTE
· Patient has been having dental pain over the past 2 weeks and has been seen multiple times in the ED for dental abscess  Was treated outpatient with oral abx with no improvement in symtpoms  · [facility] and started on Unasyn   OMS recommended transfer to [facility] for concern of Saul's Angina  · Patient now s/p operative I&D of dental abscess and extraction of teeth 30,31, & 32 by OMS  · Dental abscess extended into R sublingual, submandibular, submasseteric, and subpyerygomandibular spaces  · Penrose drain in place  · Patient left nasally intubated post-operatively given risk for airway compromise  · Continue Unasyn per OMS recommendations  · Follow-up blood cultures and wound cultures  · Continue maintenance IVF while NPO  · Airway management as described below  · Pain control with fentanyl gtt and PRN fentanyl while intubated  · Monitor fever curve and WBC count

## 2021-05-24 NOTE — PROGRESS NOTES
Patient has had priapism since 0430 this am that started after patient's condom catheter was removed  Notified urology AP  Advised to attempt ice pack and to keep bailey in for now and she will reach out to attending  Urology consult placed   Penis has good cap refill and will continue vascular checks

## 2021-05-24 NOTE — ASSESSMENT & PLAN NOTE
· Discussion was had with the patient prior to the procedure that he way require tracheostomy and risk of prolonged intubation  · Patient left nasally intubated post-operatively 2/2 to concern for airway compromise in the setting of dental abscess with localized swelling  · Vent settings: AC/VC 12/500/6/40%  · Sedation: propofol and fentanyl to maintain RASS -1  · Maintain back-up airway supplies at bedside  · Consider steroids if persistent swelling in the area

## 2021-05-24 NOTE — ASSESSMENT & PLAN NOTE
· Patient has been having dental pain over the past 2 weeks and has been seen multiple times in the ED for dental abscess  Was treated outpatient with oral abx with no improvement in symtpoms  · [facility] on 5/22 and started on Unasyn   OMS recommended transfer to [facility] for concern of Saul's Angina  · Patient now s/p operative I&D of dental abscess and extraction of teeth 30,31, & 32 by OMS  · Dental abscess extended into R sublingual, submandibular, submasseteric, and subpyerygomandibular spaces  · Penrose drain in place  · Patient left nasally intubated post-operatively given risk for airway compromise  · Continue Unasyn per OMS recommendations  · Follow-up blood cultures and wound cultures  · Continue maintenance IVF while NPO  · Airway management as described below  · Pain control with fentanyl gtt and PRN fentanyl while intubated  · Monitor fever curve and WBC count

## 2021-05-25 ENCOUNTER — ANESTHESIA (INPATIENT)
Dept: PERIOP | Facility: HOSPITAL | Age: 21
DRG: 098 | End: 2021-05-25
Payer: COMMERCIAL

## 2021-05-25 LAB
ALBUMIN SERPL BCP-MCNC: 2.4 G/DL (ref 3.5–5)
ALP SERPL-CCNC: 67 U/L (ref 46–484)
ALT SERPL W P-5'-P-CCNC: 20 U/L (ref 12–78)
ANION GAP SERPL CALCULATED.3IONS-SCNC: 9 MMOL/L (ref 4–13)
AST SERPL W P-5'-P-CCNC: 8 U/L (ref 5–45)
BACTERIA SPEC ANAEROBE CULT: NORMAL
BASOPHILS # BLD AUTO: 0.02 THOUSANDS/ΜL (ref 0–0.1)
BASOPHILS NFR BLD AUTO: 0 % (ref 0–1)
BILIRUB SERPL-MCNC: 0.36 MG/DL (ref 0.2–1)
BUN SERPL-MCNC: 13 MG/DL (ref 5–25)
CALCIUM ALBUM COR SERPL-MCNC: 8.9 MG/DL (ref 8.3–10.1)
CALCIUM SERPL-MCNC: 7.6 MG/DL (ref 8.3–10.1)
CHLORIDE SERPL-SCNC: 107 MMOL/L (ref 100–108)
CO2 SERPL-SCNC: 24 MMOL/L (ref 21–32)
CREAT SERPL-MCNC: 0.59 MG/DL (ref 0.6–1.3)
EOSINOPHIL # BLD AUTO: 0.03 THOUSAND/ΜL (ref 0–0.61)
EOSINOPHIL NFR BLD AUTO: 0 % (ref 0–6)
ERYTHROCYTE [DISTWIDTH] IN BLOOD BY AUTOMATED COUNT: 12 % (ref 11.6–15.1)
GFR SERPL CREATININE-BSD FRML MDRD: 147 ML/MIN/1.73SQ M
GLUCOSE SERPL-MCNC: 105 MG/DL (ref 65–140)
GLUCOSE SERPL-MCNC: 96 MG/DL (ref 65–140)
HCT VFR BLD AUTO: 30.1 % (ref 36.5–49.3)
HGB BLD-MCNC: 10.5 G/DL (ref 12–17)
IMM GRANULOCYTES # BLD AUTO: 0.07 THOUSAND/UL (ref 0–0.2)
IMM GRANULOCYTES NFR BLD AUTO: 1 % (ref 0–2)
LYMPHOCYTES # BLD AUTO: 1.6 THOUSANDS/ΜL (ref 0.6–4.47)
LYMPHOCYTES NFR BLD AUTO: 16 % (ref 14–44)
MAGNESIUM SERPL-MCNC: 2 MG/DL (ref 1.6–2.6)
MCH RBC QN AUTO: 29.8 PG (ref 26.8–34.3)
MCHC RBC AUTO-ENTMCNC: 34.9 G/DL (ref 31.4–37.4)
MCV RBC AUTO: 86 FL (ref 82–98)
MONOCYTES # BLD AUTO: 0.74 THOUSAND/ΜL (ref 0.17–1.22)
MONOCYTES NFR BLD AUTO: 7 % (ref 4–12)
NEUTROPHILS # BLD AUTO: 7.83 THOUSANDS/ΜL (ref 1.85–7.62)
NEUTS SEG NFR BLD AUTO: 76 % (ref 43–75)
NRBC BLD AUTO-RTO: 0 /100 WBCS
PHOSPHATE SERPL-MCNC: 1.8 MG/DL (ref 2.7–4.5)
PLATELET # BLD AUTO: 298 THOUSANDS/UL (ref 149–390)
PMV BLD AUTO: 9.8 FL (ref 8.9–12.7)
POTASSIUM SERPL-SCNC: 3.5 MMOL/L (ref 3.5–5.3)
PROT SERPL-MCNC: 5.9 G/DL (ref 6.4–8.2)
RBC # BLD AUTO: 3.52 MILLION/UL (ref 3.88–5.62)
SODIUM SERPL-SCNC: 140 MMOL/L (ref 136–145)
WBC # BLD AUTO: 10.29 THOUSAND/UL (ref 4.31–10.16)

## 2021-05-25 PROCEDURE — 0CJS7ZZ INSPECTION OF LARYNX, VIA NATURAL OR ARTIFICIAL OPENING: ICD-10-PCS | Performed by: SURGERY

## 2021-05-25 PROCEDURE — NC001 PR NO CHARGE: Performed by: SURGERY

## 2021-05-25 PROCEDURE — 84100 ASSAY OF PHOSPHORUS: CPT | Performed by: PHYSICIAN ASSISTANT

## 2021-05-25 PROCEDURE — 94760 N-INVAS EAR/PLS OXIMETRY 1: CPT

## 2021-05-25 PROCEDURE — 85025 COMPLETE CBC W/AUTO DIFF WBC: CPT | Performed by: PHYSICIAN ASSISTANT

## 2021-05-25 PROCEDURE — 99291 CRITICAL CARE FIRST HOUR: CPT | Performed by: SURGERY

## 2021-05-25 PROCEDURE — 94150 VITAL CAPACITY TEST: CPT

## 2021-05-25 PROCEDURE — 82948 REAGENT STRIP/BLOOD GLUCOSE: CPT

## 2021-05-25 PROCEDURE — 99232 SBSQ HOSP IP/OBS MODERATE 35: CPT | Performed by: UROLOGY

## 2021-05-25 PROCEDURE — 83735 ASSAY OF MAGNESIUM: CPT | Performed by: PHYSICIAN ASSISTANT

## 2021-05-25 PROCEDURE — 94003 VENT MGMT INPAT SUBQ DAY: CPT

## 2021-05-25 PROCEDURE — 80053 COMPREHEN METABOLIC PANEL: CPT | Performed by: PHYSICIAN ASSISTANT

## 2021-05-25 RX ORDER — SODIUM CHLORIDE, SODIUM LACTATE, POTASSIUM CHLORIDE, CALCIUM CHLORIDE 600; 310; 30; 20 MG/100ML; MG/100ML; MG/100ML; MG/100ML
125 INJECTION, SOLUTION INTRAVENOUS CONTINUOUS
Status: DISCONTINUED | OUTPATIENT
Start: 2021-05-25 | End: 2021-05-25

## 2021-05-25 RX ORDER — HYDROMORPHONE HCL/PF 1 MG/ML
0.2 SYRINGE (ML) INJECTION
Status: DISCONTINUED | OUTPATIENT
Start: 2021-05-25 | End: 2021-05-29 | Stop reason: HOSPADM

## 2021-05-25 RX ORDER — ALBUTEROL SULFATE 2.5 MG/3ML
2.5 SOLUTION RESPIRATORY (INHALATION) ONCE AS NEEDED
Status: DISCONTINUED | OUTPATIENT
Start: 2021-05-25 | End: 2021-05-25 | Stop reason: HOSPADM

## 2021-05-25 RX ORDER — POTASSIUM CHLORIDE 20MEQ/15ML
40 LIQUID (ML) ORAL ONCE
Status: DISCONTINUED | OUTPATIENT
Start: 2021-05-25 | End: 2021-05-25

## 2021-05-25 RX ORDER — PROPOFOL 10 MG/ML
INJECTION, EMULSION INTRAVENOUS CONTINUOUS PRN
Status: DISCONTINUED | OUTPATIENT
Start: 2021-05-25 | End: 2021-05-25

## 2021-05-25 RX ORDER — LABETALOL 20 MG/4 ML (5 MG/ML) INTRAVENOUS SYRINGE
5
Status: DISCONTINUED | OUTPATIENT
Start: 2021-05-25 | End: 2021-05-25 | Stop reason: HOSPADM

## 2021-05-25 RX ORDER — POTASSIUM CHLORIDE 14.9 MG/ML
20 INJECTION INTRAVENOUS
Status: COMPLETED | OUTPATIENT
Start: 2021-05-25 | End: 2021-05-25

## 2021-05-25 RX ORDER — GLYCOPYRROLATE 0.2 MG/ML
INJECTION INTRAMUSCULAR; INTRAVENOUS AS NEEDED
Status: DISCONTINUED | OUTPATIENT
Start: 2021-05-25 | End: 2021-05-25

## 2021-05-25 RX ORDER — HYDROMORPHONE HCL/PF 1 MG/ML
0.5 SYRINGE (ML) INJECTION
Status: DISCONTINUED | OUTPATIENT
Start: 2021-05-25 | End: 2021-05-29 | Stop reason: HOSPADM

## 2021-05-25 RX ORDER — PROMETHAZINE HYDROCHLORIDE 25 MG/ML
12.5 INJECTION, SOLUTION INTRAMUSCULAR; INTRAVENOUS ONCE AS NEEDED
Status: DISCONTINUED | OUTPATIENT
Start: 2021-05-25 | End: 2021-05-25 | Stop reason: HOSPADM

## 2021-05-25 RX ORDER — FENTANYL CITRATE/PF 50 MCG/ML
25 SYRINGE (ML) INJECTION
Status: DISCONTINUED | OUTPATIENT
Start: 2021-05-25 | End: 2021-05-25 | Stop reason: HOSPADM

## 2021-05-25 RX ORDER — ONDANSETRON 2 MG/ML
4 INJECTION INTRAMUSCULAR; INTRAVENOUS ONCE AS NEEDED
Status: DISCONTINUED | OUTPATIENT
Start: 2021-05-25 | End: 2021-05-25 | Stop reason: HOSPADM

## 2021-05-25 RX ORDER — MEPERIDINE HYDROCHLORIDE 25 MG/ML
12.5 INJECTION INTRAMUSCULAR; INTRAVENOUS; SUBCUTANEOUS ONCE
Status: DISCONTINUED | OUTPATIENT
Start: 2021-05-25 | End: 2021-05-25 | Stop reason: HOSPADM

## 2021-05-25 RX ORDER — HYDROMORPHONE HCL/PF 1 MG/ML
0.5 SYRINGE (ML) INJECTION
Status: DISCONTINUED | OUTPATIENT
Start: 2021-05-25 | End: 2021-05-25 | Stop reason: HOSPADM

## 2021-05-25 RX ADMIN — PROPOFOL 50 MCG/KG/MIN: 10 INJECTION, EMULSION INTRAVENOUS at 05:50

## 2021-05-25 RX ADMIN — ENOXAPARIN SODIUM 40 MG: 40 INJECTION SUBCUTANEOUS at 08:05

## 2021-05-25 RX ADMIN — CHLORHEXIDINE GLUCONATE 0.12% ORAL RINSE 15 ML: 1.2 LIQUID ORAL at 08:08

## 2021-05-25 RX ADMIN — SODIUM CHLORIDE 3 G: 9 INJECTION, SOLUTION INTRAVENOUS at 06:28

## 2021-05-25 RX ADMIN — PROPOFOL 50 MCG/KG/MIN: 10 INJECTION, EMULSION INTRAVENOUS at 07:59

## 2021-05-25 RX ADMIN — ONDANSETRON 4 MG: 2 INJECTION INTRAMUSCULAR; INTRAVENOUS at 22:59

## 2021-05-25 RX ADMIN — SODIUM CHLORIDE 3 G: 9 INJECTION, SOLUTION INTRAVENOUS at 17:41

## 2021-05-25 RX ADMIN — POTASSIUM CHLORIDE 20 MEQ: 14.9 INJECTION, SOLUTION INTRAVENOUS at 13:06

## 2021-05-25 RX ADMIN — PROPOFOL 50 MCG/KG/MIN: 10 INJECTION, EMULSION INTRAVENOUS at 14:58

## 2021-05-25 RX ADMIN — GLYCOPYRROLATE 0.2 MG: 0.2 INJECTION, SOLUTION INTRAMUSCULAR; INTRAVENOUS at 15:18

## 2021-05-25 RX ADMIN — Medication 75 MCG/HR: at 01:53

## 2021-05-25 RX ADMIN — FENTANYL CITRATE 25 MCG: 50 INJECTION INTRAMUSCULAR; INTRAVENOUS at 16:04

## 2021-05-25 RX ADMIN — POTASSIUM CHLORIDE 20 MEQ: 14.9 INJECTION, SOLUTION INTRAVENOUS at 11:37

## 2021-05-25 RX ADMIN — PROPOFOL 50 MCG/KG/MIN: 10 INJECTION, EMULSION INTRAVENOUS at 01:53

## 2021-05-25 RX ADMIN — FENTANYL CITRATE 50 MCG: 50 INJECTION INTRAMUSCULAR; INTRAVENOUS at 17:20

## 2021-05-25 RX ADMIN — GLYCOPYRROLATE 0.2 MG: 0.2 INJECTION, SOLUTION INTRAMUSCULAR; INTRAVENOUS at 15:01

## 2021-05-25 RX ADMIN — PROPOFOL 50 MCG/KG/MIN: 10 INJECTION, EMULSION INTRAVENOUS at 11:46

## 2021-05-25 RX ADMIN — SODIUM CHLORIDE 3 G: 9 INJECTION, SOLUTION INTRAVENOUS at 11:37

## 2021-05-25 NOTE — QUICK NOTE
Post-Op Check    Patient returned from intraoperative airway eval where it was found to have decreased pharyngeal swelling and was subsequently extubated  EBL 0  C/o neck/throat pain  Temp 100, Pulse 96, R-22, /75 (91%) 4L NC  I/O: Shift 1 2/460  Infusions: Isolyte 75/hr  Cultures: BCx2    A/P:   1  Acute respiratory compromise 2/2 dental abcess, s/p intubation, now extubated  2  Priapism, s/p drainage    Plan:  Neuro: When NPO, dilaudid IV prn for pain  Will change to NSAIDs/acetaminophen PO when passess speech eval  Neuro checks/CAM-ICU  CV: Stable  Maintain MAP >65  PULM: Monitor Airway in ICU under SD1 status  Titrate O2 to maintain SpO2 >92%  GI: NPO tonight  Swallow eval in am  : d/c bailey in am  Monitor for priapism reoccurrence  ID: [email or web address]  Trend wbc/temp/cultures  FEN: Monitor lytes    Dispo: Keep in ICU overnight for airway monitoring   Pain control Plan for downgrade tomorrow

## 2021-05-25 NOTE — ANESTHESIA PREPROCEDURE EVALUATION
Procedure: Airway exam with possible extubation (N/A Mouth)    Relevant Problems   ANESTHESIA (within normal limits)   (-) History of anesthesia complications      CARDIO   (-) Chest pain   (-) DEAN (dyspnea on exertion)      /RENAL (within normal limits)      HEMATOLOGY (within normal limits)      NEURO/PSYCH (within normal limits)      PULMONARY   (-) Respiratory failure (HCC)   (-) URI (upper respiratory infection)      Other   (+) Airway compromise   (+) Dental abscess        Physical Exam    Airway  Comment: Nasal ETT in place    TM Distance: <3 FB  Neck ROM: limited     Dental   No notable dental hx     Cardiovascular      Pulmonary      Other Findings        Anesthesia Plan  ASA Score- 3     Anesthesia Type- IV sedation with anesthesia with ASA Monitors  Additional Monitors:   Airway Plan:           Plan Factors-    Chart reviewed  Existing labs reviewed  Induction- intravenous  Postoperative Plan-     Informed Consent- Anesthetic plan and risks discussed with patient  I personally reviewed this patient with the CRNA  Discussed and agreed on the Anesthesia Plan with the CRNA  Fernie Yost

## 2021-05-25 NOTE — ASSESSMENT & PLAN NOTE
· Discussion was had with the patient prior to the procedure that he way require tracheostomy and risk of prolonged intubation  · Patient left nasally intubated post-operatively 2/2 to concern for airway compromise in the setting of dental abscess with localized swelling  · Vent settings: AC/VC 12/500/6/30%  · Sedation: propofol and fentanyl to maintain RASS -1  · Maintain back-up airway supplies at bedside

## 2021-05-25 NOTE — PROGRESS NOTES
UROLOGY PROGRESS NOTE   Patient Identifiers: Media Pod (MRN [id number])  Date of Service: 5/25/2021        Assessment:   80-year-old gentleman with priapism yesterday, did well with drainage, has not had recurrence, urology signing off     Plan:   Urology signing off  Can remove dressing  Continued care per primary team    Subjective:     24 HR EVENTS:   no significant events        Patient remains intubated and sedated    Objective:     VITALS:    Vitals:    05/25/21 0615   BP: 109/70   Pulse: (!) 44   Resp: 12   Temp: 98 1 °F (36 7 °C)   SpO2: 100%       INS & OUTS:    Reviewed pertinent values I's/O's      LABS:  Lab Results   Component Value Date    HGB 10 5 (L) 05/25/2021    HCT 30 1 (L) 05/25/2021    WBC 10 29 (H) 05/25/2021     05/25/2021   ]    Lab Results   Component Value Date    K 3 5 05/25/2021     05/25/2021    CO2 24 05/25/2021    BUN 13 05/25/2021    CREATININE 0 59 (L) 05/25/2021    CALCIUM 7 6 (L) 05/25/2021    GLUCOSE 115 05/23/2021   ]    INPATIENT MEDS:    Current Facility-Administered Medications:     acetaminophen (TYLENOL) tablet 650 mg, 650 mg, Oral, Q6H PRN, Danie Huffman PA-C    ampicillin-sulbactam (UNASYN) 3 g in sodium chloride 0 9 % 100 mL IVPB, 3 g, Intravenous, Q6H, Danie Huffman PA-C, Last Rate: 200 mL/hr at 05/25/21 0628, 3 g at 05/25/21 0628    chlorhexidine (PERIDEX) 0 12 % oral rinse 15 mL, 15 mL, Mouth/Throat, Q12H [facility], Danie Huffman PA-C, 15 mL at 05/24/21 2147    enoxaparin (LOVENOX) subcutaneous injection 40 mg, 40 mg, Subcutaneous, Q24H [facility], Danie Huffman PA-C, 40 mg at 05/24/21 0809    fentaNYL 1000 mcg in sodium chloride 0 9% 100mL infusion, 75 mcg/hr, Intravenous, Continuous, Danie Huffman PA-C, Last Rate: 7 5 mL/hr at 05/25/21 0153, 75 mcg/hr at 05/25/21 0153    fentanyl citrate (PF) 100 MCG/2ML 50 mcg, 50 mcg, Intravenous, Q1H PRN, Danie Huffmna PA-C    multi-electrolyte (PLASMALYTE-A/ISOLYTE-S PH 7 4) IV solution, 75 mL/hr, Intravenous, Continuous, Ryan Hansen PA-C, Last Rate: 75 mL/hr at 05/24/21 1150, 75 mL/hr at 05/24/21 1150    ondansetron [facility] injection 4 mg, 4 mg, Intravenous, Q6H PRN, Ryan Hansen PA-C    phenylephrine (CLAUDIA-SYNEPHRINE) 100 mcg in sodium chloride 0 9% FOR PRIAPISM, 100 mcg, Intracavernosal, Q3 min PRN, JAN Benjamin    propofol (DIPRIVAN) 1000 mg in 100 mL infusion (premix), 5-50 mcg/kg/min, Intravenous, Titrated, Ryan Hansen PA-C, Last Rate: 27 9 mL/hr at 05/25/21 0550, 50 mcg/kg/min at 05/25/21 0550      Physical Exam:   /70   Pulse (!) 44   Temp 98 1 °F (36 7 °C)   Resp 12   Wt 93 9 kg (207 lb 0 2 oz)   SpO2 100%   BMI 28 87 kg/m²   GEN: Intubated and sedated    RESP: Ventilated    CARDIAC: peripheral pulses present    ABD: soft, appropriately tender to palpation, non-distended   INCISION: Clean puncture sites   EXT: no significant peripheral edema   DRAINS: none  NEURO: Intubated and sedated   PSYCHIATRIC: Intubated and sedated    GENITOURINARY:no bladder tenderness    Montanez catheter is in place, the penis is flaccid, no recurrence of priapism      MONTANEZ: in place draining clear yellow urine        RADIOLOGY:   No new films for my review

## 2021-05-25 NOTE — OP NOTE
OPERATIVE REPORT  PATIENT NAME: Filiberto De Luna    :  2000  MRN: [id number]  Pt Location: AN OR ROOM 01    SURGERY DATE: 2021    Surgeon(s) and Role:     * Maggie Spatz, MD - Primary     * Triny Rodriguez MD - Assisting    Preop Diagnosis:  Dental abscess [K04 7]  Airway compromise [J98 8]    Post-Op Diagnosis Codes:     * Dental abscess [K04 7]     * Airway compromise [J98 8]    Procedure(s) (LRB):  Airway exam with extubation (N/A)    Specimen(s):  * No specimens in log *    Estimated Blood Loss:   Minimal    Drains:  Open Drain Right Neck (Active)   Site Description Healing;Bleeding 21 1000   Dressing Status Clean;Dry; Intact 21 1000   Drainage Appearance Bloody 21 1000   Output (mL) 0 mL 21 0400   Number of days: 2       Urethral Catheter Temperature probe (Active)   Reasons to continue Urinary Catheter  Acute urinary retention/obstruction failing urinary retention protocol 21 0801   Goal for Removal Voiding trial when ambulation improves 21 0801   Site Assessment Clean;Skin intact 21 1557   Collection Container Standard drainage bag 21 1557   Securement Method Securing device (Describe) 21 1557   Irrigant Normal saline 21 0421   Output (mL) 85 mL 21 1400   Number of days: 1       Anesthesia Type:   IV Sedation with Anesthesia    Operative Indications:  Dental abscess [K04 7]  Airway compromise [J98 8]  Nasotracheal airway    Operative Findings:  Decreased edema in floor of mouth and pharynx    Complications:   None    Procedure and Technique:  Pt was taken to OR  After transfer to the OR table his neck was prepped for a possible emergent airway  While still under heavy sedation his oropharynx was examined  There was marked improvement in his intra-oral swelling  The sedation was turned off and the patient was able to open his mouth and protrude his tongue without difficulty   His nasotracheal tube was removed and he was observed for 30 minutes  He was able to protect his airway, phonate without difficulty and had no audible stridor or wheezes  He was taken to the PACU in stable condition     I was present for the entire procedure    Patient Disposition:  PACU     SIGNATURE: Emiliano Allen MD  DATE: May 25, 2021  TIME: 4:30 PM

## 2021-05-25 NOTE — ASSESSMENT & PLAN NOTE
· 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 insertion of bailey catheter on 5/24  · Urology consulted and performed a percutaneous drainage of priapism  · Continue to monitor  · Neosynephrine ordered prn for recurrence of priapism

## 2021-05-25 NOTE — PROGRESS NOTES
Progress Note - General Surgery   Kaylie Mor 24 y o  male MRN: [id number]  Unit/Bed#: ICU 11 Encounter: [id number]    Assessment:  20M no PMHx 2 days s/p I&D right submandibular, right sublingual, right submasseteric, right pterygomandibular space infection with extraction of teeth #30,31,32 via intraoral and extraoral approaches and placement of penrose drain  Postop course complicated by priapism, s/p successful drainage  Patient has less indurated, erythematous swelling of right neck and slight improvement in trismus  Patient successfully extubated today, tolerating secretions  Plan:  - Speech and swallow evaluation: feed when able  - Continue IV antibiotics  - mIVF until cleared for PO, aggressive hydration  - Encourage OOB  - Continue peridex  - I/O per bailey  - Monitor airway  - HOB elevated at least thirty degrees to allow for gravity dependent drainage  - Dressing changes to right neck BID  Recommend ABD secured with paper tape  - Trend WBC, fever curve  - Recommend NSAID therapy, in addition to current analgesia regimen  - Allow for warm compress prn to right neck/face   - OMFS to continue to follow    Subjective/Objective     Subjective: Patient endorses improvement in facial swelling but sore throat and some foul taste per mouth  Objective: Trismus improving, can fit >1FB, extraction sockets hemostatic, intraoral portion of penrose in proper position, hemostatic  Right neck firm, more soft compared to previous exams, penrose with airknot in place on skin  Drain putting out purulent discharge, ABD soaked with ~4cc seropurulent discharge  Blood pressure 145/93, pulse 80, temperature (!) 101 3 °F (38 5 °C), resp  rate 19, weight 93 9 kg (207 lb 0 2 oz), SpO2 97 %  ,Body mass index is 28 87 kg/m²        Intake/Output Summary (Last 24 hours) at 5/25/2021 1852  Last data filed at 5/25/2021 1823  Gross per 24 hour   Intake 3310 7 ml   Output 1420 ml   Net 1890 7 ml       Invasive Devices Peripheral Intravenous Line            Peripheral IV 05/22/21 Right Antecubital 2 days    Peripheral IV 05/23/21 Left Antecubital 1 day          Drain            Open Drain Right Neck 2 days    Urethral Catheter Temperature probe 1 day                Lab, Imaging and other studies:  CBC:   Lab Results   Component Value Date    WBC 10 29 (H) 05/25/2021    HGB 10 5 (L) 05/25/2021    HCT 30 1 (L) 05/25/2021    MCV 86 05/25/2021     05/25/2021    MCH 29 8 05/25/2021    MCHC 34 9 05/25/2021    RDW 12 0 05/25/2021    MPV 9 8 05/25/2021    NRBC 0 05/25/2021   , CMP:   Lab Results   Component Value Date    SODIUM 140 05/25/2021    K 3 5 05/25/2021     05/25/2021    CO2 24 05/25/2021    BUN 13 05/25/2021    CREATININE 0 59 (L) 05/25/2021    CALCIUM 7 6 (L) 05/25/2021    AST 8 05/25/2021    ALT 20 05/25/2021    ALKPHOS 67 05/25/2021    EGFR 147 05/25/2021   , Coagulation: No results found for: PT, INR, APTT  VTE Pharmacologic Prophylaxis: Reason for no pharmacologic prophylaxis ambulating  VTE Mechanical Prophylaxis: reason for no mechanical VTE prophylaxis ambulating

## 2021-05-25 NOTE — ASSESSMENT & PLAN NOTE
· Patient has been having dental pain over the past 2 weeks and has been seen multiple times in the ED for dental abscess  Was treated outpatient with oral abx with no improvement in symtpoms  · [facility] and started on Unasyn   OMS recommended transfer to [facility] for concern of Saul's Angina  · Patient now s/p operative I&D of dental abscess and extraction of teeth 30,31, & 32 by OMS  · Dental abscess extended into R sublingual, submandibular, submasseteric, and subpyerygomandibular spaces  · Penrose drain in place  · Patient left nasally intubated post-operatively given risk for airway compromise  · Plan for return to OR today  · Continue Unasyn per OMS recommendations  · Follow-up blood cultures and wound cultures  · Continue maintenance IVF while NPO  · Airway management as described below  · Pain control with fentanyl gtt and PRN fentanyl while intubated  · Sedation: Fentanyl and propofol  · Monitor fever curve and WBC count

## 2021-05-25 NOTE — PROGRESS NOTES
[facility]  Progress Note Saleem Vasquez [facility] EMERGENCY [facility] 2000, 24 y o  male MRN: [id number]  Unit/Bed#: ICU 11 Encounter: [id number]  Primary Care Provider: Sagar Fatima DO   Date and time admitted to hospital: 5/23/2021 10:02 AM    Priapism  Assessment & Plan  · 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 insertion of bailey catheter on 5/24  · Urology consulted and performed a percutaneous drainage of priapism  · Continue to monitor  · Neosynephrine ordered prn for recurrence of priapism    Airway compromise  Assessment & Plan  · Discussion was had with the patient prior to the procedure that he way require tracheostomy and risk of prolonged intubation  · Patient left nasally intubated post-operatively 2/2 to concern for airway compromise in the setting of dental abscess with localized swelling  · Vent settings: AC/VC 12/500/6/30%  · Sedation: propofol and fentanyl to maintain RASS -1  · Maintain back-up airway supplies at bedside      * Dental abscess  Assessment & Plan  · Patient has been having dental pain over the past 2 weeks and has been seen multiple times in the ED for dental abscess  Was treated outpatient with oral abx with no improvement in symtpoms  · [facility] on 5/22 and started on Unasyn   OMS recommended transfer to [facility] for concern of Saul's Angina  · Patient now s/p operative I&D of dental abscess and extraction of teeth 30,31, & 32 by OMS  · Dental abscess extended into R sublingual, submandibular, submasseteric, and subpyerygomandibular spaces  · Penrose drain in place  · Patient left nasally intubated post-operatively given risk for airway compromise  · Plan for return to OR today  · Continue Unasyn per OMS recommendations  · Follow-up blood cultures and wound cultures  · Continue maintenance IVF while NPO  · Airway management as described below  · Pain control with fentanyl gtt and PRN fentanyl while intubated  · Sedation: Fentanyl and propofol  · Monitor fever curve and WBC count      ----------------------------------------------------------------------------------------  HPI/24hr events: Patient developed priapism yesterday during insertion of bailey catheter  Urology consulted and performed a drainage with resolution of priapism  Remained intubated 2/2 to persistent airway compromise  Fentanyl gtt increased to achieve RASS -1  Disposition: Continue Critical Care   Code Status: Level 1 - Full Code  ---------------------------------------------------------------------------------------  SUBJECTIVE  Intubated/sedated    Review of Systems   Unable to perform ROS: Intubated     Review of systems was reviewed and negative unless stated above in HPI/24-hour events   ---------------------------------------------------------------------------------------  OBJECTIVE    Vitals   Vitals:    21 1600 21 1637 21 0258   BP: 140/63      BP Location:       Pulse: (!) 52      Resp:       Temp: 99 3 °F (37 4 °C)      TempSrc:       SpO2: 100% 100% 99% 99%   Weight:         Temp (24hrs), Av 3 °F (36 8 °C), Min:97 7 °F (36 5 °C), Max:99 3 °F (37 4 °C)  Current: Temperature: 99 3 °F (37 4 °C)          Respiratory:  SpO2: SpO2: 99 %       Invasive/non-invasive ventilation settings   Respiratory    Lab Data (Last 4 hours)    None         O2/Vent Data (Last 4 hours)    None                Physical Exam  Vitals signs reviewed  Constitutional:       Interventions: He is sedated, intubated and restrained  HENT:      Head: Normocephalic and atraumatic  Nose: Nose normal       Mouth/Throat:      Mouth: Mucous membranes are moist    Eyes:      Pupils: Pupils are equal, round, and reactive to light  Cardiovascular:      Rate and Rhythm: Normal rate and regular rhythm  Pulses: Normal pulses  Dorsalis pedis pulses are 2+ on the right side and 2+ on the left side  Posterior tibial pulses are 2+ on the right side and 2+ on the left side  Heart sounds: Normal heart sounds  Pulmonary:      Effort: Pulmonary effort is normal  He is intubated  Breath sounds: Normal breath sounds  No wheezing, rhonchi or rales  Abdominal:      General: Abdomen is flat  Bowel sounds are normal  There is no distension  Palpations: Abdomen is soft  Tenderness: There is no abdominal tenderness  There is no guarding or rebound  Musculoskeletal:      Right lower leg: No edema  Left lower leg: No edema  Skin:     General: Skin is warm and dry  Capillary Refill: Capillary refill takes less than 2 seconds     Neurological:      Comments: Sedated         Laboratory and Diagnostics:  Results from last 7 days   Lab Units 05/24/21 0406 05/23/21 2142 05/23/21 2048 05/22/21  2256   WBC Thousand/uL 18 62*  --  23 65* 16 03*   HEMOGLOBIN g/dL 12 4  --  13 5 14 2   I STAT HEMOGLOBIN g/dl  --  12 2  --   --    HEMATOCRIT % 36 1*  --  40 0 40 1   HEMATOCRIT, ISTAT %  --  36*  --   --    PLATELETS Thousands/uL 354  --  396* 433*   NEUTROS PCT % 92*  --  93* 82*   MONOS PCT % 3*  --  4 5     Results from last 7 days   Lab Units 05/24/21 0406 05/23/21 2142 05/23/21 2048 05/22/21  2256   SODIUM mmol/L 142  --  140 140   POTASSIUM mmol/L 4 2  --  4 5 3 8   CHLORIDE mmol/L 106  --  105 102   CO2 mmol/L 23  --  24 28   CO2, I-STAT mmol/L  --  24  --   --    ANION GAP mmol/L 13  --  11 10   BUN mg/dL 13  --  10 13   CREATININE mg/dL 0 89  --  0 85 1 12   CALCIUM mg/dL 8 2*  --  8 6 9 3   GLUCOSE RANDOM mg/dL 140  --  109 91                   Results from last 7 days   Lab Units 05/24/21  0406   LACTIC ACID mmol/L 1 2     ABG:  Results from last 7 days   Lab Units 05/24/21 2141   PH ART  7 497*   PCO2 ART mm Hg 36 4   PO2 ART mm Hg 142 2*   HCO3 ART mmol/L 27 6   BASE EXC ART mmol/L 4 3   ABG SOURCE  Radial, Left     VBG:  Results from last 7 days   Lab Units 05/24/21 2141   ABG SOURCE  Radial, Left     Results from last 7 days   Lab Units 05/24/21  0410 05/23/21  2048   PROCALCITONIN ng/ml 0 21 <0 05       Micro  Results from last 7 days   Lab Units 05/23/21  1801 05/23/21  0122   BLOOD CULTURE   --  No Growth at 24 hrs  No Growth at 24 hrs  GRAM STAIN RESULT  No Polys or Bacteria seen  --          Intake and Output  I/O       05/23 0701 - 05/24 0700 05/24 0701 - 05/25 0700    P  O  0     I V  (mL/kg) 2401 8 (25 6) 1867 1 (19 9)    IV Piggyback  100    Total Intake(mL/kg) 2401 8 (25 6) 1967 1 (20 9)    Urine (mL/kg/hr) 575 675 (0 3)    Drains 0     Total Output 575 675    Net +1826 8 +1292 1                Height and Weights         Body mass index is 28 87 kg/m²  Weight (last 2 days)     Date/Time   Weight    05/24/21 0320   93 9 (207 01)                Nutrition       Diet Orders   (From admission, onward)             Start     Ordered    05/23/21 1908  Diet NPO  Diet effective now     Question Answer Comment   Diet Type NPO    RD to adjust diet per protocol?  Yes        05/23/21 1909 05/23/21 1154  Room Service  Once     Question:  Type of Service  Answer:  Room Service - Appropriate with Assistance    05/23/21 1153                  Active Medications  Scheduled Meds:  Current Facility-Administered Medications   Medication Dose Route Frequency Provider Last Rate    acetaminophen  650 mg Oral Q6H PRN EMANUEL Kumar-C      ampicillin-sulbactam  3 g Intravenous Q6H EMANUEL Kumar-C 3 g (05/24/21 3249)    chlorhexidine  15 mL Mouth/Throat Q12H [facility] PA-      enoxaparin  40 mg Subcutaneous Q24H [address] EMANUEL Kumar-CAREY      fentaNYL  75 mcg/hr Intravenous Continuous Ann Zavala PA-C 75 mcg/hr (05/25/21 0153)    fentanyl citrate (PF)  50 mcg Intravenous Q1H PRN Ann Zavala PA-C      multi-electrolyte  75 mL/hr Intravenous Continuous Ann Zavala, PA-C 75 mL/hr (05/24/21 1150)    ondansetron  4 mg Intravenous Q6H PRN EMANUEL Kumar-CAREY      phenylephrine  100 mcg Intracavernosal Q3 min PRN Melani [place], CRNP      propofol  5-50 mcg/kg/min Intravenous Titrated Denita Amezquita PA-C 50 mcg/kg/min (05/25/21 0153)     Continuous Infusions:  fentaNYL, 75 mcg/hr, Last Rate: 75 mcg/hr (05/25/21 0153)  multi-electrolyte, 75 mL/hr, Last Rate: 75 mL/hr (05/24/21 1150)  propofol, 5-50 mcg/kg/min, Last Rate: 50 mcg/kg/min (05/25/21 0153)      PRN Meds:   acetaminophen, 650 mg, Q6H PRN  fentanyl citrate (PF), 50 mcg, Q1H PRN  ondansetron, 4 mg, Q6H PRN  phenylephrine, 100 mcg, Q3 min PRN        Invasive Devices Review  Invasive Devices     Peripheral Intravenous Line            Peripheral IV 05/22/21 Right Antecubital 2 days    Peripheral IV 05/23/21 Left Antecubital 1 day          Drain            Open Drain Right Neck 1 day    Urethral Catheter Temperature probe less than 1 day          Airway            ETT  Cuffed;Oral 7 mm 1 day                ---------------------------------------------------------------------------------------  Advance Directive and Living Will:      Power of :    POLST:    ---------------------------------------------------------------------------------------  Care Time Delivered:   No Critical Care time spent       [place] PA-C      Portions of the record may have been created with voice recognition software  Occasional wrong word or "sound a like" substitutions may have occurred due to the inherent limitations of voice recognition software    Read the chart carefully and recognize, using context, where substitutions have occurred

## 2021-05-25 NOTE — ANESTHESIA POSTPROCEDURE EVALUATION
Post-Op Assessment Note    CV Status:  Stable  Pain Score: 0    Pain management: adequate     Mental Status:  Alert and awake   Hydration Status:  Euvolemic   PONV Controlled:  Controlled   Airway Patency:  Patent      Post Op Vitals Reviewed: Yes      Staff: CRNA         No complications documented      BP   138/72   Temp   97 6   Pulse  98   Resp   18   SpO2   96%

## 2021-05-25 NOTE — TELEPHONE ENCOUNTER
Patient currently admitted     Patient tentatively scheduled 6/22/2021 at 945am at the [facility] office      Office to call and confirm upon discharge

## 2021-05-26 LAB
ANION GAP SERPL CALCULATED.3IONS-SCNC: 12 MMOL/L (ref 4–13)
BACTERIA WND AEROBE CULT: ABNORMAL
BACTERIA WND AEROBE CULT: ABNORMAL
BASOPHILS # BLD AUTO: 0.03 THOUSANDS/ΜL (ref 0–0.1)
BASOPHILS NFR BLD AUTO: 0 % (ref 0–1)
BUN SERPL-MCNC: 8 MG/DL (ref 5–25)
CALCIUM SERPL-MCNC: 8.5 MG/DL (ref 8.3–10.1)
CHLORIDE SERPL-SCNC: 104 MMOL/L (ref 100–108)
CO2 SERPL-SCNC: 24 MMOL/L (ref 21–32)
CREAT SERPL-MCNC: 0.79 MG/DL (ref 0.6–1.3)
EOSINOPHIL # BLD AUTO: 0.02 THOUSAND/ΜL (ref 0–0.61)
EOSINOPHIL NFR BLD AUTO: 0 % (ref 0–6)
ERYTHROCYTE [DISTWIDTH] IN BLOOD BY AUTOMATED COUNT: 12 % (ref 11.6–15.1)
GFR SERPL CREATININE-BSD FRML MDRD: 130 ML/MIN/1.73SQ M
GLUCOSE SERPL-MCNC: 81 MG/DL (ref 65–140)
GRAM STN SPEC: ABNORMAL
HCT VFR BLD AUTO: 37.8 % (ref 36.5–49.3)
HGB BLD-MCNC: 12.7 G/DL (ref 12–17)
IMM GRANULOCYTES # BLD AUTO: 0.13 THOUSAND/UL (ref 0–0.2)
IMM GRANULOCYTES NFR BLD AUTO: 1 % (ref 0–2)
LYMPHOCYTES # BLD AUTO: 1.67 THOUSANDS/ΜL (ref 0.6–4.47)
LYMPHOCYTES NFR BLD AUTO: 16 % (ref 14–44)
MAGNESIUM SERPL-MCNC: 2.3 MG/DL (ref 1.6–2.6)
MCH RBC QN AUTO: 29.5 PG (ref 26.8–34.3)
MCHC RBC AUTO-ENTMCNC: 33.6 G/DL (ref 31.4–37.4)
MCV RBC AUTO: 88 FL (ref 82–98)
MONOCYTES # BLD AUTO: 0.75 THOUSAND/ΜL (ref 0.17–1.22)
MONOCYTES NFR BLD AUTO: 7 % (ref 4–12)
NEUTROPHILS # BLD AUTO: 7.77 THOUSANDS/ΜL (ref 1.85–7.62)
NEUTS SEG NFR BLD AUTO: 76 % (ref 43–75)
NRBC BLD AUTO-RTO: 0 /100 WBCS
PHOSPHATE SERPL-MCNC: 4.2 MG/DL (ref 2.7–4.5)
PLATELET # BLD AUTO: 386 THOUSANDS/UL (ref 149–390)
PMV BLD AUTO: 9.7 FL (ref 8.9–12.7)
POTASSIUM SERPL-SCNC: 4.4 MMOL/L (ref 3.5–5.3)
RBC # BLD AUTO: 4.3 MILLION/UL (ref 3.88–5.62)
SODIUM SERPL-SCNC: 140 MMOL/L (ref 136–145)
WBC # BLD AUTO: 10.37 THOUSAND/UL (ref 4.31–10.16)

## 2021-05-26 PROCEDURE — 92610 EVALUATE SWALLOWING FUNCTION: CPT

## 2021-05-26 PROCEDURE — 87040 BLOOD CULTURE FOR BACTERIA: CPT | Performed by: PHYSICIAN ASSISTANT

## 2021-05-26 PROCEDURE — 85025 COMPLETE CBC W/AUTO DIFF WBC: CPT | Performed by: PHYSICIAN ASSISTANT

## 2021-05-26 PROCEDURE — 80048 BASIC METABOLIC PNL TOTAL CA: CPT | Performed by: PHYSICIAN ASSISTANT

## 2021-05-26 PROCEDURE — 84100 ASSAY OF PHOSPHORUS: CPT | Performed by: PHYSICIAN ASSISTANT

## 2021-05-26 PROCEDURE — 99232 SBSQ HOSP IP/OBS MODERATE 35: CPT | Performed by: SURGERY

## 2021-05-26 PROCEDURE — 83735 ASSAY OF MAGNESIUM: CPT | Performed by: PHYSICIAN ASSISTANT

## 2021-05-26 RX ORDER — ACETAMINOPHEN 160 MG/5ML
650 SUSPENSION, ORAL (FINAL DOSE FORM) ORAL EVERY 6 HOURS PRN
Status: DISCONTINUED | OUTPATIENT
Start: 2021-05-26 | End: 2021-05-29 | Stop reason: HOSPADM

## 2021-05-26 RX ADMIN — SODIUM CHLORIDE 3 G: 9 INJECTION, SOLUTION INTRAVENOUS at 00:12

## 2021-05-26 RX ADMIN — HYDROMORPHONE HYDROCHLORIDE 0.2 MG: 1 INJECTION, SOLUTION INTRAMUSCULAR; INTRAVENOUS; SUBCUTANEOUS at 02:14

## 2021-05-26 RX ADMIN — SODIUM CHLORIDE 3 G: 9 INJECTION, SOLUTION INTRAVENOUS at 12:24

## 2021-05-26 RX ADMIN — HYDROMORPHONE HYDROCHLORIDE 0.5 MG: 1 INJECTION, SOLUTION INTRAMUSCULAR; INTRAVENOUS; SUBCUTANEOUS at 22:34

## 2021-05-26 RX ADMIN — FAMOTIDINE 20 MG: 10 INJECTION, SOLUTION INTRAVENOUS at 16:13

## 2021-05-26 RX ADMIN — SODIUM CHLORIDE 3 G: 9 INJECTION, SOLUTION INTRAVENOUS at 05:32

## 2021-05-26 RX ADMIN — HYDROMORPHONE HYDROCHLORIDE 0.5 MG: 1 INJECTION, SOLUTION INTRAMUSCULAR; INTRAVENOUS; SUBCUTANEOUS at 00:36

## 2021-05-26 RX ADMIN — HYDROMORPHONE HYDROCHLORIDE 0.2 MG: 1 INJECTION, SOLUTION INTRAMUSCULAR; INTRAVENOUS; SUBCUTANEOUS at 15:37

## 2021-05-26 RX ADMIN — HYDROMORPHONE HYDROCHLORIDE 0.2 MG: 1 INJECTION, SOLUTION INTRAMUSCULAR; INTRAVENOUS; SUBCUTANEOUS at 05:32

## 2021-05-26 RX ADMIN — SODIUM CHLORIDE 3 G: 9 INJECTION, SOLUTION INTRAVENOUS at 18:26

## 2021-05-26 NOTE — NURSING NOTE
Patient has a temp of 101 7 and is NPO  Mentioned rectal suppository, patient adamantly refused  CC AP Mynor Iyer aware

## 2021-05-26 NOTE — PROGRESS NOTES
[facility]  Progress Note Manuela Seymour 2000, 24 y o  male MRN: [id number]  Unit/Bed#: ICU 11 Encounter: [id number]  Primary Care Provider: Cathy Lee DO   Date and time admitted to hospital: 5/23/2021 10:02 AM    * Dental abscess  Assessment & Plan  · Patient has been having dental pain over the past 2 weeks and has been seen multiple times in the ED for dental abscess  Was treated outpatient with oral abx with no improvement in symtpoms  · Presented to [facility] on 5/22 and started on Unasyn   OMS recommended transfer to [facility] for concern of Saul's Angina  · Patient now s/p operative I&D of dental abscess and extraction of teeth 30,31, & 32 by OMS  · Dental abscess extended into R sublingual, submandibular, submasseteric, and subpyerygomandibular spaces  · Penrose drain in place  · Patient s/p left nasally intubated post-operatively given risk for airway compromise  · Repeat OR for airway management and extubation   · Continue Unasyn per OMS recommendations  · Follow-up blood cultures and wound cultures  · Continue maintenance IVF while NPO  · Speech evaluation pending   · Monitor fever curve and WBC count  · Encourage OOB   · Maintain HOB >30 degrees   · Dressing changes to neck BID     Airway compromise  Assessment & Plan  · Patient left nasally intubated post-operatively 2/2 to concern for airway compromise in the setting of dental abscess with localized swelling  · Extubated 5 25   · Maintain back-up airway supplies at bedside      Priapism  Assessment & Plan  · 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 insertion of bailey catheter on 5/24  · Urology consulted and performed a percutaneous drainage of priapism  · Continue to monitor  · Neosynephrine ordered prn for recurrence of priapism    Leukocytosis  Assessment & Plan  · Likely multifactorial in the setting of steroid and surgery   · Continue to trend   · Blood cultures ordered, however patient refusing at this time        ----------------------------------------------------------------------------------------  HPI/24hr events: 24year old male admitted due to GEOVANNA Proctor 57 angina POD 3 from incision and drainage of dental abscess  Overnight, patient with multiple PRN dilaudid doses  Refused blood culture draw  Febrile with tmax 101 7F  Disposition: Continue Critical Care   Code Status: Level 1 - Full Code  ---------------------------------------------------------------------------------------  SUBJECTIVE  Patient very tearful on exam  He is frustrated that he is in so much pain  He is currently refusing blood cultures because of pain level  Review of Systems   HENT: Positive for dental problem and trouble swallowing  Neck pain at RADHAMES drain; facial swelling and pain   Respiratory: Negative for shortness of breath  Cardiovascular: Negative for chest pain  Gastrointestinal: Negative for abdominal pain       Review of systems was reviewed and negative unless stated above in HPI/24-hour events   ---------------------------------------------------------------------------------------  OBJECTIVE    Vitals   Vitals:    21 1800 21 1900 21 2246 21 0100   BP: 145/93 139/64 105/53 131/62   BP Location:   Left arm    Pulse: 80 82 72 (!) 52   Resp:   (!) 27    Temp: (!) 101 3 °F (38 5 °C) (!) 101 3 °F (38 5 °C) 97 8 °F (36 6 °C) (!) 100 8 °F (38 2 °C)   TempSrc:   Axillary    SpO2: 97% 95% 99% 99%   Weight:         Temp (24hrs), Av 1 °F (37 3 °C), Min:97 8 °F (36 6 °C), Max:101 3 °F (38 5 °C)  Current: Temperature: (!) 100 8 °F (38 2 °C)          Respiratory:  SpO2: SpO2: 99 %, SpO2 Activity: SpO2 Activity: At Rest, SpO2 Device: O2 Device: BiPAP  Nasal Cannula O2 Flow Rate (L/min): 4 L/min    Invasive/non-invasive ventilation settings   Respiratory    Lab Data (Last 4 hours)    None         O2/Vent Data (Last 4 hours)    None                Physical Exam  Constitutional:       General: He is in acute distress  Appearance: He is obese  He is ill-appearing  He is not toxic-appearing or diaphoretic  HENT:      Head: Normocephalic and atraumatic  Nose: Nose normal       Mouth/Throat:      Comments: Significant R cheek swelling; RADHAMES drain in place  Eyes:      General: No scleral icterus  Conjunctiva/sclera: Conjunctivae normal    Neck:      Musculoskeletal: Neck supple  Muscular tenderness present  Cardiovascular:      Rate and Rhythm: Regular rhythm  Bradycardia present  Heart sounds: Normal heart sounds  Pulmonary:      Effort: Pulmonary effort is normal  No respiratory distress  Breath sounds: Normal breath sounds  Abdominal:      Palpations: Abdomen is soft  Genitourinary:     Comments: Zelaya  Musculoskeletal:      Right lower leg: Edema present  Left lower leg: Edema present  Skin:     General: Skin is warm and dry  Capillary Refill: Capillary refill takes less than 2 seconds  Coloration: Skin is not jaundiced  Neurological:      General: No focal deficit present  Mental Status: He is alert and oriented to person, place, and time     Psychiatric:      Comments: Lengthy discussion of importance of obtaining blood cultures and possible side effects of delayed treatment and patient still refusing          Laboratory and Diagnostics:  Results from last 7 days   Lab Units 05/25/21  0607 05/24/21 0406 05/23/21 2142 05/23/21 2048 05/22/21  2256   WBC Thousand/uL 10 29* 18 62*  --  23 65* 16 03*   HEMOGLOBIN g/dL 10 5* 12 4  --  13 5 14 2   I STAT HEMOGLOBIN g/dl  --   --  12 2  --   --    HEMATOCRIT % 30 1* 36 1*  --  40 0 40 1   HEMATOCRIT, ISTAT %  --   --  36*  --   --    PLATELETS Thousands/uL 298 354  --  396* 433*   NEUTROS PCT % 76* 92*  --  93* 82*   MONOS PCT % 7 3*  --  4 5     Results from last 7 days   Lab Units 05/25/21  0607 05/24/21  0406 05/23/21 2142 05/23/21 2048 05/22/21  2256   SODIUM mmol/L 140 142  --  140 140   POTASSIUM mmol/L 3 5 4 2  --  4 5 3 8   CHLORIDE mmol/L 107 106  --  105 102   CO2 mmol/L 24 23  --  24 28   CO2, I-STAT mmol/L  --   --  24  --   --    ANION GAP mmol/L 9 13  --  11 10   BUN mg/dL 13 13  --  10 13   CREATININE mg/dL 0 59* 0 89  --  0 85 1 12   CALCIUM mg/dL 7 6* 8 2*  --  8 6 9 3   GLUCOSE RANDOM mg/dL 105 140  --  109 91   ALT U/L 20  --   --   --   --    AST U/L 8  --   --   --   --    ALK PHOS U/L 67  --   --   --   --    ALBUMIN g/dL 2 4*  --   --   --   --    TOTAL BILIRUBIN mg/dL 0 36  --   --   --   --      Results from last 7 days   Lab Units 05/25/21  0607   MAGNESIUM mg/dL 2 0   PHOSPHORUS mg/dL 1 8*               Results from last 7 days   Lab Units 05/24/21  0406   LACTIC ACID mmol/L 1 2     ABG:  Results from last 7 days   Lab Units 05/24/21  2141   PH ART  7 497*   PCO2 ART mm Hg 36 4   PO2 ART mm Hg 142 2*   HCO3 ART mmol/L 27 6   BASE EXC ART mmol/L 4 3   ABG SOURCE  Radial, Left     VBG:  Results from last 7 days   Lab Units 05/24/21  2141   ABG SOURCE  Radial, Left     Results from last 7 days   Lab Units 05/24/21  0410 05/23/21  2048   PROCALCITONIN ng/ml 0 21 <0 05       Micro  Results from last 7 days   Lab Units 05/23/21  1801 05/23/21  0122   BLOOD CULTURE   --  No Growth at 48 hrs  No Growth at 48 hrs  GRAM STAIN RESULT  No Polys or Bacteria seen  --    WOUND CULTURE  Culture results to follow  --        EKG: Reviewed       Imaging: I have personally reviewed pertinent films in PACS    Intake and Output  I/O       05/24 0701 - 05/25 0700 05/25 0701 - 05/26 0700    I V  (mL/kg) 2529 5 (26 9) 1744 7 (18 6)    IV Piggyback 100 500    Total Intake(mL/kg) 2629 5 (28) 2244 7 (23 9)    Urine (mL/kg/hr) 1065 (0 5) 2785 (1 2)    Total Output 1065 2785    Net +1564 5 -540  3                Height and Weights         Body mass index is 28 87 kg/m²    Weight (last 2 days)     Date/Time   Weight    05/24/21 0320   93 9 (207 01)                Nutrition       Diet Orders   (From admission, onward) Start     Ordered    05/23/21 1908  Diet NPO  Diet effective now     Question Answer Comment   Diet Type NPO    RD to adjust diet per protocol?  Yes        05/23/21 1909 05/23/21 1154  Room Service  Once     Question:  Type of Service  Answer:  Room Service - Appropriate with Assistance    05/23/21 1153                  Active Medications  Scheduled Meds:  Current Facility-Administered Medications   Medication Dose Route Frequency Provider Last Rate    ampicillin-sulbactam  3 g Intravenous Q6H JAN Wilson 3 g (05/26/21 0012)    chlorhexidine  15 mL Mouth/Throat Q12H [address] JAN Wilson      enoxaparin  40 mg Subcutaneous Q24H [address] JAN Wilson      HYDROmorphone  0 2 mg Intravenous Q3H PRN Faheem Dey PA-C      HYDROmorphone  0 5 mg Intravenous Q3H PRN Faheem Dey PA-C      multi-electrolyte  75 mL/hr Intravenous Continuous JAN Wilson 75 mL/hr (05/24/21 1150)    ondansetron  4 mg Intravenous Q6H PRN JAN Wilson       Continuous Infusions:  multi-electrolyte, 75 mL/hr, Last Rate: 75 mL/hr (05/24/21 1150)      PRN Meds:   HYDROmorphone, 0 2 mg, Q3H PRN  HYDROmorphone, 0 5 mg, Q3H PRN  ondansetron, 4 mg, Q6H PRN        Invasive Devices Review  Invasive Devices     Peripheral Intravenous Line            Peripheral IV 05/22/21 Right Antecubital 3 days    Peripheral IV 05/23/21 Left Antecubital 2 days          Drain            Open Drain Right Neck 2 days    Urethral Catheter Temperature probe 1 day                Rationale for remaining devices: Critically ill   ---------------------------------------------------------------------------------------  Advance Directive and Living Will:      Power of :    POLST:    ---------------------------------------------------------------------------------------  Care Time Delivered:   No Critical Care time spent       Rakan Watson PA-C

## 2021-05-26 NOTE — QUICK NOTE
I personally held discussion with patient regarding importance of obtaining blood cultures  Discussed possible complications with delaying treatment  Patient stated that he is refusing at this time due to pain level

## 2021-05-26 NOTE — ASSESSMENT & PLAN NOTE
· Likely multifactorial in the setting of steroid and surgery   · Continue to trend   · Blood cultures ordered, however patient refusing at this time

## 2021-05-26 NOTE — ASSESSMENT & PLAN NOTE
· Patient has been having dental pain over the past 2 weeks and has been seen multiple times in the ED for dental abscess  Was treated outpatient with oral abx with no improvement in symtpoms  · [facility] and started on Unasyn   OMS recommended transfer to [facility] for concern of Saul's Angina  · Patient now s/p operative I&D of dental abscess and extraction of teeth 30,31, & 32 by OMS  · Dental abscess extended into R sublingual, submandibular, submasseteric, and subpyerygomandibular spaces  · Penrose drain in place  · Patient s/p left nasally intubated post-operatively given risk for airway compromise  · Repeat OR for airway management and extubation   · Continue Unasyn per OMS recommendations  · Follow-up blood cultures and wound cultures  · Continue maintenance IVF while NPO  · Speech evaluation pending   · Monitor fever curve and WBC count  · Encourage OOB   · Maintain HOB >30 degrees   · Dressing changes to neck BID

## 2021-05-26 NOTE — ASSESSMENT & PLAN NOTE
· Patient left nasally intubated post-operatively 2/2 to concern for airway compromise in the setting of dental abscess with localized swelling  · Extubated 5 25   · Maintain back-up airway supplies at bedside

## 2021-05-26 NOTE — ANESTHESIA POSTPROCEDURE EVALUATION
Post-Op Assessment Note    CV Status:  Stable  Pain Score: 0    Pain management: adequate     Mental Status:  Awake and sleepy   Hydration Status:  Euvolemic   PONV Controlled:  Controlled   Airway Patency:  Patent  Airway: intubated      Post Op Vitals Reviewed: Yes      Staff: Anesthesiologist, CRNA   Reason for prolonged intubation > 24 hours:  AIRWAY SWELLING      No complications documented      BP      Temp      Pulse    Resp      SpO2

## 2021-05-26 NOTE — SPEECH THERAPY NOTE
Speech-Language Pathology Bedside Swallow Evaluation        Patient Name: aKylie Kumari    [id number] Date: 5/26/2021     Problem List  Principal Problem:    Dental abscess  Active Problems:    Leukocytosis    Airway compromise    Priapism         Past Medical History  Past Medical History:   Diagnosis Date    Asthma        Past Surgical History  History reviewed  No pertinent surgical history  Summary    Pt presents with limited assessment due to patient's refusal to take p o  Patient complains that tube in his neck is sticking him in the back of his throat and he does not want to continue trying to eat or drink until it is removed  However patient did tolerate limited amounts of nectar thick liquids by straw  Recommendations:   Diet: ok for sips of NTL and ice chips   Meds: non-oral if possible and crushed in NTL if pt allows   Frequent Oral care: 4x/day- as able  Aspiration precautions   Other Recommendations/ considerations: Will continue to follow for ongoing assessment  Current Medical Status  Pt is a 24 y o  male who presented to [address] [place] [address]  with dental abscess  Pt presents with right-sided dental pain with abscess  Initially presented to [address] [place] [address],[address]  Patient had 4 ER visits secondary to right-sided dental pain and failed outpatient antibiotics  Had repeat CT which showed abscess  There was concern for Saul's angina and the patient was transferred to [facility] to see OMFS and started on IV Unasyn  Still reporting swelling in the right side of his face and severe dental pain  No difficulty with breathing and his airway is not currently threatened  Denies any fevers or chills  Is not able to eat any food secondary to swelling  Pt was nasally intubated post-operatively due to concern for airway compromise   Pt was extubated 5/25 in the OR    Past medical history:   Please see H&P for details    Special Studies:  CXR: 5/23/21 ET tube tip is approximately 5-6 cm from the seth    No active pulmonary disease    CT-soft tissue neck w/ contrast: 5/22/21 Significant interval worsening in inflammatory change involving the right submandibular, sublingual regions and lateral floor of the the mouth with a new 3 6 x 2 6 x 4 2 cm abscess, when compared to a CT neck dated May 14, 2021  The findings are   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  Again, the site infection appears to be related to a subcentimeter odontogenic abscess along the right mandible at the level of the 1st molar tooth  Social/Education/Vocational Hx:  Pt lives with family    Swallow Information   Current Risks for Dysphagia & Aspiration: recent intubation  Current Symptoms/Concerns: pharyngeal swelling due to dental abscess  Current Diet: NPO   Baseline Diet: regular diet and thin liquids  Takes pills- whole w/ water     Baseline Assessment   Behavior/Cognition: alert  Speech/Language Status: able to participate in conversation and able to follow commands  Patient Positioning: upright in bed     Swallow Mechanism Exam ** pt self suctioning oral secretions w/ vernoicakaur  Facial: symmetrical  Labial: WFL  Lingual: WFL  Velum: unable to visualize  Mandible:  decreased ROM  Dentition: adequate  Vocal quality:clear/adequate   Volitional Cough: strong/productive   Respiratory: RA    Consistencies Assessed and Performance   Consistencies Administered: ice chips and nectar thick   ** patient mostly refusing p o  Due to complaints of pain in his throat from the tube sticking out  Patient reluctantly took 2 oz of nectar thick by straw and 1 ice chip then stated   "let's not push it "    Oral Stage:  Patient with decreased oral opening, but was able to suck nectar thick from straw  Patient with good oral control of nectar thick liquids  Patient kept 1 ice chips between his lips not beyond his teeth  The ice chip eventually "fell out" of his mouth      Pharyngeal Stage:  Swallow initiation appeared prompt and complete  Voice was clear  Occasional delayed moist cough was noted      Esophageal Concerns: none reported      Results Reviewed with: patient and RN   Dysphagia Goals: pt will participate in repeat swallow eval to determine safety of po intake and/or further instrumental testing        April Milan WILLIAN Curtis CCC-SLP  Speech Pathologist  [place] license # [id number]  [place] license # [id number]  Available via [employer]

## 2021-05-27 PROCEDURE — 99232 SBSQ HOSP IP/OBS MODERATE 35: CPT | Performed by: INTERNAL MEDICINE

## 2021-05-27 PROCEDURE — 92526 ORAL FUNCTION THERAPY: CPT

## 2021-05-27 RX ADMIN — SODIUM CHLORIDE 3 G: 9 INJECTION, SOLUTION INTRAVENOUS at 23:36

## 2021-05-27 RX ADMIN — HYDROMORPHONE HYDROCHLORIDE 0.2 MG: 1 INJECTION, SOLUTION INTRAMUSCULAR; INTRAVENOUS; SUBCUTANEOUS at 11:01

## 2021-05-27 RX ADMIN — SODIUM CHLORIDE 3 G: 9 INJECTION, SOLUTION INTRAVENOUS at 00:51

## 2021-05-27 RX ADMIN — HYDROMORPHONE HYDROCHLORIDE 0.5 MG: 1 INJECTION, SOLUTION INTRAMUSCULAR; INTRAVENOUS; SUBCUTANEOUS at 23:33

## 2021-05-27 RX ADMIN — SODIUM CHLORIDE 3 G: 9 INJECTION, SOLUTION INTRAVENOUS at 14:35

## 2021-05-27 RX ADMIN — HYDROMORPHONE HYDROCHLORIDE 0.5 MG: 1 INJECTION, SOLUTION INTRAMUSCULAR; INTRAVENOUS; SUBCUTANEOUS at 03:19

## 2021-05-27 RX ADMIN — ONDANSETRON 4 MG: 2 INJECTION INTRAMUSCULAR; INTRAVENOUS at 23:33

## 2021-05-27 RX ADMIN — ONDANSETRON 4 MG: 2 INJECTION INTRAMUSCULAR; INTRAVENOUS at 11:07

## 2021-05-27 RX ADMIN — HYDROMORPHONE HYDROCHLORIDE 0.2 MG: 1 INJECTION, SOLUTION INTRAMUSCULAR; INTRAVENOUS; SUBCUTANEOUS at 06:28

## 2021-05-27 RX ADMIN — ONDANSETRON 4 MG: 2 INJECTION INTRAMUSCULAR; INTRAVENOUS at 06:28

## 2021-05-27 RX ADMIN — SODIUM CHLORIDE, SODIUM GLUCONATE, SODIUM ACETATE, POTASSIUM CHLORIDE, MAGNESIUM CHLORIDE, SODIUM PHOSPHATE, DIBASIC, AND POTASSIUM PHOSPHATE 75 ML/HR: .53; .5; .37; .037; .03; .012; .00082 INJECTION, SOLUTION INTRAVENOUS at 08:44

## 2021-05-27 RX ADMIN — SODIUM CHLORIDE 3 G: 9 INJECTION, SOLUTION INTRAVENOUS at 06:28

## 2021-05-27 RX ADMIN — SODIUM CHLORIDE 3 G: 9 INJECTION, SOLUTION INTRAVENOUS at 18:46

## 2021-05-27 RX ADMIN — ENOXAPARIN SODIUM 40 MG: 40 INJECTION SUBCUTANEOUS at 08:46

## 2021-05-27 RX ADMIN — HYDROMORPHONE HYDROCHLORIDE 0.5 MG: 1 INJECTION, SOLUTION INTRAMUSCULAR; INTRAVENOUS; SUBCUTANEOUS at 14:33

## 2021-05-27 RX ADMIN — FAMOTIDINE 20 MG: 10 INJECTION, SOLUTION INTRAVENOUS at 08:45

## 2021-05-27 RX ADMIN — ONDANSETRON 4 MG: 2 INJECTION INTRAMUSCULAR; INTRAVENOUS at 17:46

## 2021-05-27 NOTE — PROGRESS NOTES
[facility] Progress Note  Patient: Merly Dow 24 y o  male   MRN: [id number]  PCP: Los Quiroz DO  Unit/Bed#: S -01 Encounter: [id number]  Date Of Visit: 05/27/21    Assessment:    Principal Problem:    Dental abscess  Active Problems:    Leukocytosis    Airway compromise    Priapism      Plan:    · Dental Abscess -   · Background - Patient had been having dental pain over the 2 weeks prior to admission and was seen multiple times in the ED for dental abscess  Was treated outpatient with oral abx with no improvement in symptoms  [facility] on [facility] and started on Unasyn  Was noted to have dental abscess which was determined to extend to right sublingual, submandibular, submasseteric, and subpyerygomandibular spaces  Seen by OMFS who recommended transfer to [facility] for concern of Saul's Angina  · Antibiotics - Unasyn 3g IV q6h  · Antipyretic - Tylenol suspension PRN  · Cultures -   · Follow up blood cultures 5/23/2021 and 5/26/2021  · Wound culture from 05/23/2021 showed group B strep  · Procedure: I&D of dental abscess with extraction of teeth 07,33,59 by OMFS  Penrose drain was placed at that time  Had prolonged intubation post operatively due to concerns for airway compromise  Was extubated on 05/25/2021  · Timing of Penrose drain removal will be determined by OMFS  · Warm compress PRN to right neck / face  · HOB to be maintained at least 30 degrees with aspiration precautions  · Pain control -   · Tylenol PRN  · IV dilaudid PRN  · Monitor pain levels  · Serial exams, monitor temperatures, trend WBC count  · Saul Angina with Airway Compromise POA -   · Continue to monitor airway  · Monitor oxygen saturations - currently on room air he is 95 - 98%  · Oropharyngeal Dysphagia -   · Speech pathology saw patient today and cleared patient for full liquid diet      · Follow up any recommendations from continued speech pathology evaluations  · Priapism -   · Unknown etiology  · Procedure: Dorsal nerve block of penis with percutaneous drainage of priapism by Dr Noa Pandya on 05/24/2021  · Has had no recurrence of priapism  · Transiently required bailey catheter  At this point, bailey is removed and patient is doing well with urination  · Serial monitoring / symptom assessment  VTE Pharmacologic Prophylaxis:   VTE Score: 2 Lovenox    Mechanical VTE Prophylaxis in Place: No    Patient Centered Rounds: I have performed bedside rounds with nursing staff today  Discussions with Specialists or Other Care Team Provider: Oral surgery    Education and Discussions with Family / Patient: family updated at bedside  Time Spent for Care: 30 minutes  More than 50% of total time spent on counseling and coordination of care as described above  Current Length of Stay: 4 day(s)  Current Patient Status: Inpatient     Certification Statement: The patient will continue to require additional inpatient hospital stay due to continued IV antibiotics, monitoring, and reasessment by oral surgery as indicated above  Discharge Plan / Estimated Discharge Date: Anticipate discharge in 48 hrs to home  Code Status: Level 1 - Full Code      Subjective: The patient does get some pain intermittently but has not been using much in the way of the opioid pain medications  The patient states that the swelling is significantly improved and his family at bedside does agree with this  The patient states that he was able to swallow pills even when his face was more swollen  He denies any shortness of breath or odynophagia  He states that his jaw is still sore as he would anticipate having just had teeth pulled  The patient has had no fevers or chills  He denies any nausea vomiting or upset stomach  The patient has had no difficulty urinating since Bailey catheter was removed  The patient has been asking nursing about removing the Penrose drain  Nursing states that he has had scant to no drainage from the drain recently  Objective:     Vitals:   Temp (24hrs), Av °F (37 2 °C), Min:98 °F (36 7 °C), Max:99 8 °F (37 7 °C)    Temp:  [98 °F (36 7 °C)-99 8 °F (37 7 °C)] 98 5 °F (36 9 °C)  HR:  [66-79] 66  Resp:  [16-18] 16  BP: (128-134)/(68-74) 132/74  SpO2:  [95 %-98 %] 95 %  Body mass index is 30 56 kg/m²  Input and Output Summary (last 24 hours): Intake/Output Summary (Last 24 hours) at 2021 1526  Last data filed at 2021 1300  Gross per 24 hour   Intake 617 5 ml   Output 1600 ml   Net -982 5 ml       Physical Exam:   Physical Exam  Vitals signs reviewed  HENT:      Nose: Nose normal       Mouth/Throat:      Mouth: Mucous membranes are moist       Pharynx: Oropharynx is clear  Comments: Swelling noted still in the right jaw still but no overlying erythema  Penrose drain is intact but has not had any real drainage  Tenderness along the mandible, more near the ramus of the jaw  Decreased range of motion for jaw  Cardiovascular:      Rate and Rhythm: Normal rate and regular rhythm  Pulmonary:      Effort: Pulmonary effort is normal       Breath sounds: No stridor  No wheezing, rhonchi or rales  Abdominal:      General: Bowel sounds are normal  There is no distension  Palpations: Abdomen is soft  Tenderness: There is no abdominal tenderness  Musculoskeletal:         General: No swelling or tenderness  Skin:     General: Skin is warm and dry  Coloration: Skin is not pale  Findings: No rash  Neurological:      General: No focal deficit present  Mental Status: He is alert       Additional Data:     Labs:  Results from last 7 days   Lab Units 21  0616   WBC Thousand/uL 10 37*   HEMOGLOBIN g/dL 12 7   HEMATOCRIT % 37 8   PLATELETS Thousands/uL 386   NEUTROS PCT % 76*   LYMPHS PCT % 16   MONOS PCT % 7   EOS PCT % 0     Results from last 7 days   Lab Units 21  0616 21  0607   SODIUM mmol/L 140 140   POTASSIUM mmol/L 4 4 3 5   CHLORIDE mmol/L 104 107   CO2 mmol/L 24 24   BUN mg/dL 8 13   CREATININE mg/dL 0 79 0 59*   ANION GAP mmol/L 12 9   CALCIUM mg/dL 8 5 7 6*   ALBUMIN g/dL  --  2 4*   TOTAL BILIRUBIN mg/dL  --  0 36   ALK PHOS U/L  --  67   ALT U/L  --  20   AST U/L  --  8   GLUCOSE RANDOM mg/dL 81 105         Results from last 7 days   Lab Units 05/25/21  0554 05/23/21  1552 05/23/21  1513   POC GLUCOSE mg/dl 96 110 65         Results from last 7 days   Lab Units 05/24/21  0410 05/24/21  0406 05/23/21  2048   LACTIC ACID mmol/L  --  1 2  --    PROCALCITONIN ng/ml 0 21  --  <0 05       Imaging: Reviewed radiology reports from this admission including: all imaging since admitted  Recent Cultures (last 7 days):     Results from last 7 days   Lab Units 05/26/21  0616 05/23/21  1801 05/23/21  0122   BLOOD CULTURE  No Growth at 24 hrs  No Growth at 24 hrs   --  No Growth After 4 Days  No Growth After 4 Days     GRAM STAIN RESULT   --  No Polys or Bacteria seen  --    WOUND CULTURE   --  1+ Growth of Beta Hemolytic Streptococcus Group F*  1+ Growth of - Oral   --        Lines/Drains:  Invasive Devices     Peripheral Intravenous Line            Peripheral IV 05/23/21 Left Antecubital 3 days          Drain            Open Drain Right Neck 3 days                Telemetry:        Last 24 Hours Medication List:   Current Facility-Administered Medications   Medication Dose Route Frequency Provider Last Rate    acetaminophen  650 mg Oral Q6H PRN Jayden Beat, DO      ampicillin-sulbactam  3 g Intravenous Q6H JAN Lock 3 g (05/27/21 1435)    enoxaparin  40 mg Subcutaneous Q24H [address] JAN Lock      famotidine  20 mg Intravenous Daily Jayden Beat, DO      HYDROmorphone  0 2 mg Intravenous Q3H PRN Aaron Spearing, JAN      HYDROmorphone  0 5 mg Intravenous Q3H PRN Aaron SpearingJAN      multi-electrolyte  75 mL/hr Intravenous Continuous Regina Lawson Ambrosia, JAN 75 mL/hr (05/27/21 0844)    ondansetron  4 mg Intravenous Q6H PRN JAN Castellano          Today, Patient Was Seen By: Ana Escalante DO    ** Please Note: This note has been constructed using a voice recognition system   **

## 2021-05-27 NOTE — PLAN OF CARE
Problem: PAIN - ADULT  Goal: Verbalizes/displays adequate comfort level or baseline comfort level  Description: Interventions:  - Encourage patient to monitor pain and request assistance  - Assess pain using appropriate pain scale  - Administer analgesics based on type and severity of pain and evaluate response  - Implement non-pharmacological measures as appropriate and evaluate response  - Consider cultural and social influences on pain and pain management  - Notify physician/advanced practitioner if interventions unsuccessful or patient reports new pain  Outcome: Progressing     Problem: INFECTION - ADULT  Goal: Absence or prevention of progression during hospitalization  Description: INTERVENTIONS:  - Assess and monitor for signs and symptoms of infection  - Monitor lab/diagnostic results  - Monitor all insertion sites, i e  indwelling lines, tubes, and drains  - Monitor endotracheal if appropriate and nasal secretions for changes in amount and color  - [place] appropriate cooling/warming therapies per order  - Administer medications as ordered  - Instruct and encourage patient and family to use good hand hygiene technique  - Identify and instruct in appropriate isolation precautions for identified infection/condition  Outcome: Progressing  Goal: Absence of fever/infection during neutropenic period  Description: INTERVENTIONS:  - Monitor WBC    Outcome: Progressing     Problem: SAFETY ADULT  Goal: Patient will remain free of falls  Description: INTERVENTIONS:  - Assess patient frequently for physical needs  -  Identify cognitive and physical deficits and behaviors that affect risk of falls    -  [place] fall precautions as indicated by assessment   - Educate patient/family on patient safety including physical limitations  - Instruct patient to call for assistance with activity based on assessment  - Modify environment to reduce risk of injury  - Consider OT/PT consult to assist with strengthening/mobility  Outcome: Progressing  Goal: Maintain or return to baseline ADL function  Description: INTERVENTIONS:  -  Assess patient's ability to carry out ADLs; assess patient's baseline for ADL function and identify physical deficits which impact ability to perform ADLs (bathing, care of mouth/teeth, toileting, grooming, dressing, etc )  - Assess/evaluate cause of self-care deficits   - Assess range of motion  - Assess patient's mobility; develop plan if impaired  - Assess patient's need for assistive devices and provide as appropriate  - Encourage maximum independence but intervene and supervise when necessary  - Involve family in performance of ADLs  - Assess for home care needs following discharge   - Consider OT consult to assist with ADL evaluation and planning for discharge  - Provide patient education as appropriate  Outcome: Progressing  Goal: Maintain or return mobility status to optimal level  Description: INTERVENTIONS:  - Assess patient's baseline mobility status (ambulation, transfers, stairs, etc )    - Identify cognitive and physical deficits and behaviors that affect mobility  - Identify mobility aids required to assist with transfers and/or ambulation (gait belt, sit-to-stand, lift, walker, cane, etc )  - [place] fall precautions as indicated by assessment  - Record patient progress and toleration of activity level on Mobility SBAR; progress patient to next Phase/Stage  - Instruct patient to call for assistance with activity based on assessment  - Consider rehabilitation consult to assist with strengthening/weightbearing, etc   Outcome: Progressing     Problem: DISCHARGE PLANNING  Goal: Discharge to home or other facility with appropriate resources  Description: INTERVENTIONS:  - Identify barriers to discharge w/patient and caregiver  - Arrange for needed discharge resources and transportation as appropriate  - Identify discharge learning needs (meds, wound care, etc )  - Arrange for interpretive services to assist at discharge as needed  - Refer to Case Management Department for coordinating discharge planning if the patient needs post-hospital services based on physician/advanced practitioner order or complex needs related to functional status, cognitive ability, or social support system  Outcome: Progressing     Problem: Knowledge Deficit  Goal: Patient/family/caregiver demonstrates understanding of disease process, treatment plan, medications, and discharge instructions  Description: Complete learning assessment and assess knowledge base    Interventions:  - Provide teaching at level of understanding  - Provide teaching via preferred learning methods  Outcome: Progressing     Problem: SKIN/TISSUE INTEGRITY - ADULT  Goal: Skin integrity remains intact  Description: INTERVENTIONS  - Identify patients at risk for skin breakdown  - Assess and monitor skin integrity  - Assess and monitor nutrition and hydration status  - Monitor labs (i e  albumin)  - Assess for incontinence   - Turn and reposition patient  - Assist with mobility/ambulation  - Relieve pressure over bony prominences  - Avoid friction and shearing  - Provide appropriate hygiene as needed including keeping skin clean and dry  - Evaluate need for skin moisturizer/barrier cream  - Collaborate with interdisciplinary team (i e  Nutrition, Rehabilitation, etc )   - Patient/family teaching  Outcome: Progressing  Goal: Incision(s), wounds(s) or drain site(s) healing without S/S of infection  Description: INTERVENTIONS  - Assess and document risk factors for skin impairment   - Assess and document dressing, incision, wound bed, drain sites and surrounding tissue  - Consider nutrition services referral as needed  - Oral mucous membranes remain intact  - Provide patient/ family education  Outcome: Progressing  Goal: Oral mucous membranes remain intact  Description: INTERVENTIONS  - Assess oral mucosa and hygiene practices  - Implement preventative oral hygiene regimen  - Implement oral medicated treatments as ordered  - Initiate Nutrition services referral as needed  Outcome: Progressing     Problem: Nutrition/Hydration-ADULT  Goal: Nutrient/Hydration intake appropriate for improving, restoring or maintaining nutritional needs  Description: Monitor and assess patient's nutrition/hydration status for malnutrition  Collaborate with interdisciplinary team and initiate plan and interventions as ordered  Monitor patient's weight and dietary intake as ordered or per policy  Utilize nutrition screening tool and intervene as necessary  Determine patient's food preferences and provide high-protein, high-caloric foods as appropriate       INTERVENTIONS:  - Monitor oral intake, urinary output, labs, and treatment plans  - Assess nutrition and hydration status and recommend course of action  - Evaluate amount of meals eaten  - Assist patient with eating if necessary   - Allow adequate time for meals  - Recommend/ encourage appropriate diets, oral nutritional supplements, and vitamin/mineral supplements  - Order, calculate, and assess calorie counts as needed  - Recommend, monitor, and adjust tube feedings and TPN/PPN based on assessed needs  - Assess need for intravenous fluids  - Provide specific nutrition/hydration education as appropriate  - Include patient/family/caregiver in decisions related to nutrition  Outcome: Progressing     Problem: Prexisting or High Potential for Compromised Skin Integrity  Goal: Skin integrity is maintained or improved  Description: INTERVENTIONS:  - Identify patients at risk for skin breakdown  - Assess and monitor skin integrity  - Assess and monitor nutrition and hydration status  - Monitor labs   - Assess for incontinence   - Turn and reposition patient  - Assist with mobility/ambulation  - Relieve pressure over bony prominences  - Avoid friction and shearing  - Provide appropriate hygiene as needed including keeping skin clean and dry  - Evaluate need for skin moisturizer/barrier cream  - Collaborate with interdisciplinary team   - Patient/family teaching  - Consider wound care consult   Outcome: Progressing     Problem: Potential for Falls  Goal: Patient will remain free of falls  Description: INTERVENTIONS:  - Assess patient frequently for physical needs  -  Identify cognitive and physical deficits and behaviors that affect risk of falls    -  [place] fall precautions as indicated by assessment   - Educate patient/family on patient safety including physical limitations  - Instruct patient to call for assistance with activity based on assessment  - Modify environment to reduce risk of injury  - Consider OT/PT consult to assist with strengthening/mobility  Outcome: Progressing

## 2021-05-27 NOTE — PLAN OF CARE
Problem: PAIN - ADULT  Goal: Verbalizes/displays adequate comfort level or baseline comfort level  Description: Interventions:  - Encourage patient to monitor pain and request assistance  - Assess pain using appropriate pain scale  - Administer analgesics based on type and severity of pain and evaluate response  - Implement non-pharmacological measures as appropriate and evaluate response  - Consider cultural and social influences on pain and pain management  - Notify physician/advanced practitioner if interventions unsuccessful or patient reports new pain  Outcome: Progressing     Problem: INFECTION - ADULT  Goal: Absence or prevention of progression during hospitalization  Description: INTERVENTIONS:  - Assess and monitor for signs and symptoms of infection  - Monitor lab/diagnostic results  - Monitor all insertion sites, i e  indwelling lines, tubes, and drains  - Monitor endotracheal if appropriate and nasal secretions for changes in amount and color  - [place] appropriate cooling/warming therapies per order  - Administer medications as ordered  - Instruct and encourage patient and family to use good hand hygiene technique  - Identify and instruct in appropriate isolation precautions for identified infection/condition  Outcome: Progressing  Goal: Absence of fever/infection during neutropenic period  Description: INTERVENTIONS:  - Monitor WBC    Outcome: Progressing     Problem: SAFETY ADULT  Goal: Patient will remain free of falls  Description: INTERVENTIONS:  - Assess patient frequently for physical needs  -  Identify cognitive and physical deficits and behaviors that affect risk of falls    -  [place] fall precautions as indicated by assessment   - Educate patient/family on patient safety including physical limitations  - Instruct patient to call for assistance with activity based on assessment  - Modify environment to reduce risk of injury  - Consider OT/PT consult to assist with strengthening/mobility  Outcome: Progressing  Goal: Maintain or return to baseline ADL function  Description: INTERVENTIONS:  -  Assess patient's ability to carry out ADLs; assess patient's baseline for ADL function and identify physical deficits which impact ability to perform ADLs (bathing, care of mouth/teeth, toileting, grooming, dressing, etc )  - Assess/evaluate cause of self-care deficits   - Assess range of motion  - Assess patient's mobility; develop plan if impaired  - Assess patient's need for assistive devices and provide as appropriate  - Encourage maximum independence but intervene and supervise when necessary  - Involve family in performance of ADLs  - Assess for home care needs following discharge   - Consider OT consult to assist with ADL evaluation and planning for discharge  - Provide patient education as appropriate  Outcome: Progressing  Goal: Maintain or return mobility status to optimal level  Description: INTERVENTIONS:  - Assess patient's baseline mobility status (ambulation, transfers, stairs, etc )    - Identify cognitive and physical deficits and behaviors that affect mobility  - Identify mobility aids required to assist with transfers and/or ambulation (gait belt, sit-to-stand, lift, walker, cane, etc )  - [place] fall precautions as indicated by assessment  - Record patient progress and toleration of activity level on Mobility SBAR; progress patient to next Phase/Stage  - Instruct patient to call for assistance with activity based on assessment  - Consider rehabilitation consult to assist with strengthening/weightbearing, etc   Outcome: Progressing     Problem: DISCHARGE PLANNING  Goal: Discharge to home or other facility with appropriate resources  Description: INTERVENTIONS:  - Identify barriers to discharge w/patient and caregiver  - Arrange for needed discharge resources and transportation as appropriate  - Identify discharge learning needs (meds, wound care, etc )  - Arrange for interpretive services to assist at discharge as needed  - Refer to Case Management Department for coordinating discharge planning if the patient needs post-hospital services based on physician/advanced practitioner order or complex needs related to functional status, cognitive ability, or social support system  Outcome: Progressing     Problem: Knowledge Deficit  Goal: Patient/family/caregiver demonstrates understanding of disease process, treatment plan, medications, and discharge instructions  Description: Complete learning assessment and assess knowledge base    Interventions:  - Provide teaching at level of understanding  - Provide teaching via preferred learning methods  Outcome: Progressing     Problem: SKIN/TISSUE INTEGRITY - ADULT  Goal: Skin integrity remains intact  Description: INTERVENTIONS  - Identify patients at risk for skin breakdown  - Assess and monitor skin integrity  - Assess and monitor nutrition and hydration status  - Monitor labs (i e  albumin)  - Assess for incontinence   - Turn and reposition patient  - Assist with mobility/ambulation  - Relieve pressure over bony prominences  - Avoid friction and shearing  - Provide appropriate hygiene as needed including keeping skin clean and dry  - Evaluate need for skin moisturizer/barrier cream  - Collaborate with interdisciplinary team (i e  Nutrition, Rehabilitation, etc )   - Patient/family teaching  Outcome: Progressing  Goal: Incision(s), wounds(s) or drain site(s) healing without S/S of infection  Description: INTERVENTIONS  - Assess and document risk factors for skin impairment   - Assess and document dressing, incision, wound bed, drain sites and surrounding tissue  - Consider nutrition services referral as needed  - Oral mucous membranes remain intact  - Provide patient/ family education  Outcome: Progressing  Goal: Oral mucous membranes remain intact  Description: INTERVENTIONS  - Assess oral mucosa and hygiene practices  - Implement preventative oral hygiene regimen  - Implement oral medicated treatments as ordered  - Initiate Nutrition services referral as needed  Outcome: Progressing     Problem: Nutrition/Hydration-ADULT  Goal: Nutrient/Hydration intake appropriate for improving, restoring or maintaining nutritional needs  Description: Monitor and assess patient's nutrition/hydration status for malnutrition  Collaborate with interdisciplinary team and initiate plan and interventions as ordered  Monitor patient's weight and dietary intake as ordered or per policy  Utilize nutrition screening tool and intervene as necessary  Determine patient's food preferences and provide high-protein, high-caloric foods as appropriate       INTERVENTIONS:  - Monitor oral intake, urinary output, labs, and treatment plans  - Assess nutrition and hydration status and recommend course of action  - Evaluate amount of meals eaten  - Assist patient with eating if necessary   - Allow adequate time for meals  - Recommend/ encourage appropriate diets, oral nutritional supplements, and vitamin/mineral supplements  - Order, calculate, and assess calorie counts as needed  - Recommend, monitor, and adjust tube feedings and TPN/PPN based on assessed needs  - Assess need for intravenous fluids  - Provide specific nutrition/hydration education as appropriate  - Include patient/family/caregiver in decisions related to nutrition  Outcome: Progressing     Problem: Prexisting or High Potential for Compromised Skin Integrity  Goal: Skin integrity is maintained or improved  Description: INTERVENTIONS:  - Identify patients at risk for skin breakdown  - Assess and monitor skin integrity  - Assess and monitor nutrition and hydration status  - Monitor labs   - Assess for incontinence   - Turn and reposition patient  - Assist with mobility/ambulation  - Relieve pressure over bony prominences  - Avoid friction and shearing  - Provide appropriate hygiene as needed including keeping skin clean and dry  - Evaluate need for skin moisturizer/barrier cream  - Collaborate with interdisciplinary team   - Patient/family teaching  - Consider wound care consult   Outcome: Progressing     Problem: Potential for Falls  Goal: Patient will remain free of falls  Description: INTERVENTIONS:  - Assess patient frequently for physical needs  -  Identify cognitive and physical deficits and behaviors that affect risk of falls    -  [place] fall precautions as indicated by assessment   - Educate patient/family on patient safety including physical limitations  - Instruct patient to call for assistance with activity based on assessment  - Modify environment to reduce risk of injury  - Consider OT/PT consult to assist with strengthening/mobility  Outcome: Progressing

## 2021-05-27 NOTE — SPEECH THERAPY NOTE
Speech Language/Pathology    Speech/Language Pathology Progress Note    Patient Name: Cuate ACEVEDO Date: 5/27/2021       Subjective:  Patient seen for dysphagia therapy follow-up  Remains NPO with sips of nectar thick liquids or ice chips  Patient stated he is hungry and is more cooperative with p o  Trials today, although states he still wants the tube out of his neck  Objective:  Patient seated upright but keeps his head turned to the left  Patient drink nectar thick liquids by straw, also agreeable to trial tsp of applesauce, and sips of thin water by straw  Patient had some difficulty with bolus retrieval from tsp due to limited oral cavity opening, only able to take 1/2 tsp amounts at a time  Patient stated he was told that he would not be able to fully open his mouth for a couple months  Minimal manipulation, slow transfer was noted  Patient appeared with good oral control of thick and thin liquids  Swallows were timely  And complete, with no coughing, choking, or wet voice noted  Assessment:  Patient with increased cooperation for p o  Trial assessment  Patient was able to tolerate puree and thin liquids without significant dysphagia or signs/ symptoms of aspiration  Plan/Recommendations:  Recommend begin full liquid diet for now  Plan to assess for mechanical soft foods tomorrow as able  P o  Med should be in liquid form or crushed in applesauce    Will continue to follow    Jaswinder Cross CCC-SLP  Speech Pathologist  Available via  tiger text

## 2021-05-27 NOTE — NURSING NOTE
Pt refusing IV change and lab work this morning  Educated pt as to why the need for IV change and labs and still refused at this time and asked to do it later because he would like to relax now  Will report to oncoming nurse

## 2021-05-28 LAB
BACTERIA BLD CULT: NORMAL
BACTERIA BLD CULT: NORMAL

## 2021-05-28 PROCEDURE — 92526 ORAL FUNCTION THERAPY: CPT

## 2021-05-28 PROCEDURE — 99232 SBSQ HOSP IP/OBS MODERATE 35: CPT | Performed by: INTERNAL MEDICINE

## 2021-05-28 RX ORDER — AMOXICILLIN AND CLAVULANATE POTASSIUM 875; 125 MG/1; MG/1
1 TABLET, FILM COATED ORAL EVERY 12 HOURS SCHEDULED
Status: DISCONTINUED | OUTPATIENT
Start: 2021-05-28 | End: 2021-05-29 | Stop reason: HOSPADM

## 2021-05-28 RX ADMIN — SODIUM CHLORIDE, SODIUM GLUCONATE, SODIUM ACETATE, POTASSIUM CHLORIDE, MAGNESIUM CHLORIDE, SODIUM PHOSPHATE, DIBASIC, AND POTASSIUM PHOSPHATE 75 ML/HR: .53; .5; .37; .037; .03; .012; .00082 INJECTION, SOLUTION INTRAVENOUS at 00:25

## 2021-05-28 RX ADMIN — SODIUM CHLORIDE 3 G: 9 INJECTION, SOLUTION INTRAVENOUS at 12:46

## 2021-05-28 RX ADMIN — AMOXICILLIN AND CLAVULANATE POTASSIUM 1 TABLET: 875; 125 TABLET, FILM COATED ORAL at 17:24

## 2021-05-28 RX ADMIN — SODIUM CHLORIDE 3 G: 9 INJECTION, SOLUTION INTRAVENOUS at 05:48

## 2021-05-28 RX ADMIN — FAMOTIDINE 20 MG: 10 INJECTION, SOLUTION INTRAVENOUS at 12:22

## 2021-05-28 NOTE — PROGRESS NOTES
[facility] Medicine Progress Note  Patient: Kia Oriental orthodox 24 y o  male   MRN: [id number]  PCP: Marysol Cochran DO  Unit/Bed#: S -01 Encounter: [id number]  Date Of Visit: 05/28/21    Assessment:    Principal Problem:    Dental abscess  Active Problems:    Leukocytosis    Airway compromise    Priapism      Plan:    · Dental Abscess -   · Background - Patient had been having dental pain over the 2 weeks prior to admission and was seen multiple times in the ED for dental abscess  Was treated outpatient with oral abx with no improvement in symptoms  Presented to [facility] on 5/22 and started on Unasyn  Was noted to have dental abscess which was determined to extend to right sublingual, submandibular, submasseteric, and subpyerygomandibular spaces  Seen by OMFS who recommended transfer to [facility] for concern of Saul's Angina  · Antibiotics - Unasyn 3g IV q6h  A recommendation of oral surgery, will transition to Augmentin today  Monitor on Augmentin  · Antipyretic - Tylenol suspension PRN  · Cultures -   · Final results of blood cultures from 05/23/2021 is negative x5 days  Follow up final results of blood cultures from 5/26/2021  · Wound culture from 05/23/2021 showed beta-hemolytic group F strep  · Procedure: I&D of dental abscess with extraction of teeth 09,60,29 by OMFS  Penrose drain was placed at that time  Had prolonged intubation post operatively due to concerns for airway compromise  Was extubated on 05/25/2021  · Penrose drain was removed 05/27/2021  · Warm compress PRN to right neck / face  · HOB to be maintained at least 30 degrees with aspiration precautions  · Pain control -   · Tylenol PRN  · IV dilaudid PRN  · Monitor pain levels  · Serial exams, monitor temperatures, trend WBC count  · Saul Angina with Airway Compromise POA -   · Continue to monitor airway  · Monitor oxygen saturations - currently on room air he is 95 - 98%    · Oropharyngeal Dysphagia - · Speech pathology saw patient today and advanced the diet from full liquid diet to dysphagia level 2 diet  · Follow up any recommendations from continued speech pathology evaluations  · Priapism -   · Unknown etiology  · Procedure: Dorsal nerve block of penis with percutaneous drainage of priapism by Dr Jeronimo Stoner on 05/24/2021  · Has had no recurrence of priapism  · Transiently required bailey catheter  At this point, bailey is removed and patient is doing well with urination  · Serial monitoring / symptom assessment  · Generalized weakness - discussed with physical therapy briefly  However, will work with nursing in the patient to see if we can get him up moving 1st before physical therapy sees him  If the patient demonstrates true weakness, physical therapy may need to work with him  No focal weakness identified on exam     VTE Pharmacologic Prophylaxis:   VTE Score: 2 Lovenox    Mechanical VTE Prophylaxis in Place: No    Patient Centered Rounds: I have performed bedside rounds with nursing staff today  Discussions with Specialists or Other Care Team Provider: Oral surgery by tiger text today    Education and Discussions with Family / Patient: Patient declined call to   Time Spent for Care: 30 minutes  More than 50% of total time spent on counseling and coordination of care as described above  Current Length of Stay: 5 day(s)  Current Patient Status: Inpatient     Certification Statement: The patient will continue to require additional inpatient hospital stay due to continued IV antibiotics, monitoring, and reasessment by oral surgery as indicated above  Discharge Plan / Estimated Discharge Date: Anticipate discharge in 48 hrs to home  Code Status: Level 1 - Full Code      Subjective: The patient had the drain removed from his neck/jaw yesterday and his pain is improved  He does still have soreness in the right jaw region    He is not having any odynophagia and no dysphagia  He also denies any shortness of breath  The patient has had no fevers or chills  The patient generally feels weak in terms of mobility trying to get around the room  He states that he had a little bit of vomiting last night but when I do ask him to qualify this further he states that he had spit up some mucus  Objective:     Vitals:   Temp (24hrs), Av 4 °F (36 9 °C), Min:98 3 °F (36 8 °C), Max:98 6 °F (37 °C)    Temp:  [98 3 °F (36 8 °C)-98 6 °F (37 °C)] 98 4 °F (36 9 °C)  HR:  [63-77] 77  Resp:  [16-18] 18  BP: (125-139)/(68-78) 125/68  SpO2:  [96 %-98 %] 97 %  Body mass index is 30 96 kg/m²  Input and Output Summary (last 24 hours): Intake/Output Summary (Last 24 hours) at 2021 1549  Last data filed at 2021 1441  Gross per 24 hour   Intake 1240 ml   Output 3205 ml   Net -1965 ml       Physical Exam:   Physical Exam  Vitals signs reviewed  HENT:      Nose: Nose normal       Mouth/Throat:      Mouth: Mucous membranes are moist       Pharynx: Oropharynx is clear  Comments: Swelling noted still in the right jaw still but no overlying erythema  Penrose drain has been removed  There is some dried blood in the region of the Penrose drain insertion site  Tenderness along the mandible, more near the ramus of the jaw  Slight erythema is noted in that area of tenderness  Decreased range of motion for jaw  Eyes:      Pupils: Pupils are equal, round, and reactive to light  Cardiovascular:      Rate and Rhythm: Normal rate and regular rhythm  Pulmonary:      Effort: Pulmonary effort is normal       Breath sounds: No stridor  No wheezing, rhonchi or rales  Abdominal:      General: Bowel sounds are normal  There is no distension  Palpations: Abdomen is soft  Tenderness: There is no abdominal tenderness  Musculoskeletal:         General: No swelling or tenderness  Neurological:      Mental Status: He is alert  Motor: No weakness (No focal weakness)  Additional Data:     Labs:  Results from last 7 days   Lab Units 05/26/21  0616   WBC Thousand/uL 10 37*   HEMOGLOBIN g/dL 12 7   HEMATOCRIT % 37 8   PLATELETS Thousands/uL 386   NEUTROS PCT % 76*   LYMPHS PCT % 16   MONOS PCT % 7   EOS PCT % 0     Results from last 7 days   Lab Units 05/26/21  0616 05/25/21  0607   SODIUM mmol/L 140 140   POTASSIUM mmol/L 4 4 3 5   CHLORIDE mmol/L 104 107   CO2 mmol/L 24 24   BUN mg/dL 8 13   CREATININE mg/dL 0 79 0 59*   ANION GAP mmol/L 12 9   CALCIUM mg/dL 8 5 7 6*   ALBUMIN g/dL  --  2 4*   TOTAL BILIRUBIN mg/dL  --  0 36   ALK PHOS U/L  --  67   ALT U/L  --  20   AST U/L  --  8   GLUCOSE RANDOM mg/dL 81 105         Results from last 7 days   Lab Units 05/25/21  0554 05/23/21  1552 05/23/21  1513   POC GLUCOSE mg/dl 96 110 65         Results from last 7 days   Lab Units 05/24/21  0410 05/24/21  0406 05/23/21  2048   LACTIC ACID mmol/L  --  1 2  --    PROCALCITONIN ng/ml 0 21  --  <0 05       Imaging: Reviewed radiology reports from this admission including: all imaging since admitted  Recent Cultures (last 7 days):     Results from last 7 days   Lab Units 05/26/21  0616 05/23/21  1801 05/23/21  0122   BLOOD CULTURE  No Growth at 48 hrs  No Growth at 48 hrs  --  No Growth After 5 Days  No Growth After 5 Days     GRAM STAIN RESULT   --  No Polys or Bacteria seen  --    WOUND CULTURE   --  1+ Growth of Beta Hemolytic Streptococcus Group F*  1+ Growth of - Oral   --        Lines/Drains:  Invasive Devices     Peripheral Intravenous Line            Peripheral IV 05/23/21 Left Antecubital 4 days          Drain            Open Drain Right Neck 4 days                Telemetry:        Last 24 Hours Medication List:   Current Facility-Administered Medications   Medication Dose Route Frequency Provider Last Rate    acetaminophen  650 mg Oral Q6H PRN Katie Section, DO      amoxicillin-clavulanate  1 tablet Oral Q12H [address] Curt Berry, DO      enoxaparin  40 mg Subcutaneous Q24H [address] JAN Lock      famotidine  20 mg Intravenous Daily Neeraj Davidson DO      HYDROmorphone  0 2 mg Intravenous Q3H PRN JAN Bowser      HYDROmorphone  0 5 mg Intravenous Q3H PRN JAN Bowser      ondansetron  4 mg Intravenous Q6H PRN JAN Bowser          Today, Patient Was Seen By: Neeraj Davidson DO    ** Please Note: This note has been constructed using a voice recognition system   **

## 2021-05-28 NOTE — UTILIZATION REVIEW
Continued Stay Review    Date: 5/28/2021                          Current Patient Class: inpt  Current Level of Care: med surg     HPI:20 y o  male initially admitted on 5/23 directly admitted from [place] [address] ED to [facility] inpatient bed for dental abscess, leukocytosis  He has had right sided dental pain for 3 weeks with 4 prior visits to the ED, failed outpatient abx  CT shows abscess and there was concern for ludwigs angina therefore he was transferred for OMFS eval    Started on IV abx in the ED  NPO  Started on IV fluids  OMFS consult  Assessment/Plan:   5/24  Dorsal nerve block & drainage of priaspism   5/25 Airway exam with extubation   OPERATIVE REPORT  SURGERY DATE: 5/25/2021  Procedure(s) (LRB):  Airway exam with extubation (N/A)   Anesthesia Type:   IV Sedation with Anesthesia   Operative Findings:  Decreased edema in floor of mouth and pharynx; His nasotracheal tube was removed and he was observed for 30 minutes  He was able to protect his airway, phonate without difficulty and had no audible stridor or wheezes  He was taken to the PACU in stable condition    5/25 Cont ICU for airway monitoring, IVF, IV antibx  DC Zelaya in AM-monitor for priapism reoccurrence, monitor electrolytes  Speech eval for swallow- feed when able  5/25/2021 RN NOTE:  Patient has a temp of 101 7 and is NPO  Mentioned rectal suppository, patient adamantly refused/ refusing blood culture draw  5/26/2021 patient with multiple PRN dilaudid doses  Refused blood culture draw  Febrile with tmax 101 7F  5/26 SPEECH: Pt  with limited assessment due to patient's refusal to take p o  Patient complains that tube in his neck is sticking him in the back of his throat and he does not want to continue trying to eat or drink until it is removed  Patient did tolerate limited amounts of nectar thick liquids by straw     Recommendations:   Diet: ok for sips of NTL and ice chips   Meds: non-oral if possible and crushed in NTL if pt allows   Frequent Oral care: 4x/day- as able  Aspiration precautions   5/27 Cont IV antibx, Cultures: Follow up blood cultures 5/23/2021 and 5/26/2021, Wound culture  Group B strep  Pain control, IV dilaudid prn  Monitor airway; on room air saturations 95-98%  Speech cleared for clear liquid diet; Unknown etiology for Priapism; no recurrence; transiently req Nathalie Zelaya is DC doing well w urination  OMS following    5/28 drain removed from his neck/jaw yesterday and his pain is improved  He does still have soreness in the right jaw region  He is not having any odynophagia and no dysphagia  IV Unasyn antibx transition to Augmentin today  Blood cultures negative x5 days; wound culture Beta hemolytic strep, pain management  Montior pain levels serial exams  Temps & WBC trends  Monitor airway & saturations  Advance diet per SPEECH  Nursing working with patient due to his weakness, if demonstrating true weakness may require PT       Vital Signs:   Date/Time  Temp  Pulse  Resp  BP  MAP (mmHg)  SpO2  O2 Device  Patient Position - Orthostatic VS   05/28/21 0714  98 6 °F (37 °C)  64  16  139/78  --  96 %  None (Room air)  Lying   05/27/21 2145  98 3 °F (36 8 °C)  63  18  137/69  97  98 %  None (Room air)  Lying   05/27/21 1522  98 5 °F (36 9 °C)  66  16  132/74  97  95 %  None (Room air)  Lying   05/27/21 0600  98 °F (36 7 °C)  68  16  128/68  --  98 %  None (Room air)  Lying   05/26/21 2100  99 6 °F (37 6 °C)  79  18  134/73  98  96 %  --  Sitting   05/26/21 1826  99 8 °F (37 7 °C)  --  --  --  --  --  --  --   05/26/21 1300  99 7 °F (37 6 °C)  89  --  131/69  94  96 %  --  --   05/26/21 1200  99 7 °F (37 6 °C)  66  --  121/58  84  96 %  --  --   05/26/21 0900  99 3 °F (37 4 °C)  55  --  132/67  93  100 %  --  --   05/26/21 0800  99 7 °F (37 6 °C)  52Abnormal   --  128/60  87  99 %  --  --   05/26/21 0700  99 9 °F (37 7 °C)  58  18  135/63  91  100 %  Nasal cannula  Lying   05/26/21 0300  99 9 °F (37 7 °C) 54Abnormal   --  124/72  92  99 %  --  --   05/26/21 0100  100 8 °F (38 2 °C)Abnormal   52Abnormal   --  131/62  89  99 %  --  --      Weights (last 14 days)    Date/Time  Weight  Weight Method   05/28/21 0554  101 kg (222 lb 0 1 oz)  Bed scale   05/27/21 0600  99 4 kg (219 lb 2 2 oz)  Bed scale   05/24/21 0320  93 9 kg (207 lb 0 2 oz)  Bed scale            Pertinent Labs/Diagnostic Results:       Results from last 7 days   Lab Units 05/26/21  0616 05/25/21  0607 05/24/21  0406 05/23/21 2142 05/23/21 2048   WBC Thousand/uL 10 37* 10 29* 18 62*  --  23 65*   HEMOGLOBIN g/dL 12 7 10 5* 12 4  --  13 5   I STAT HEMOGLOBIN g/dl  --   --   --  12 2  --    HEMATOCRIT % 37 8 30 1* 36 1*  --  40 0   HEMATOCRIT, ISTAT %  --   --   --  36*  --    PLATELETS Thousands/uL 386 298 354  --  396*   NEUTROS ABS Thousands/µL 7 77* 7 83* 17 27*  --  22 22*         Results from last 7 days   Lab Units 05/26/21  0616 05/25/21  0607 05/24/21  0406 05/23/21 2142 05/23/21 2048 05/22/21  2256   SODIUM mmol/L 140 140 142  --  140 140   POTASSIUM mmol/L 4 4 3 5 4 2  --  4 5 3 8   CHLORIDE mmol/L 104 107 106  --  105 102   CO2 mmol/L 24 24 23  --  24 28   CO2, I-STAT mmol/L  --   --   --  24  --   --    ANION GAP mmol/L 12 9 13  --  11 10   BUN mg/dL 8 13 13  --  10 13   CREATININE mg/dL 0 79 0 59* 0 89  --  0 85 1 12   EGFR ml/min/1 73sq m 130 147 124  --  126 95   CALCIUM mg/dL 8 5 7 6* 8 2*  --  8 6 9 3   MAGNESIUM mg/dL 2 3 2 0  --   --   --   --    PHOSPHORUS mg/dL 4 2 1 8*  --   --   --   --      Results from last 7 days   Lab Units 05/25/21  0607   AST U/L 8   ALT U/L 20   ALK PHOS U/L 67   TOTAL PROTEIN g/dL 5 9*   ALBUMIN g/dL 2 4*   TOTAL BILIRUBIN mg/dL 0 36     Results from last 7 days   Lab Units 05/25/21  0554 05/23/21  1552 05/23/21  1513   POC GLUCOSE mg/dl 96 110 65     Results from last 7 days   Lab Units 05/26/21  0616 05/25/21  0607 05/24/21  0406 05/23/21  2048 05/22/21  2256   GLUCOSE RANDOM mg/dL 81 105 140 109 91 No results found for: BETA-HYDROXYBUTYRATE   Results from last 7 days   Lab Units 05/24/21  2141   PH ART  7 497*   PCO2 ART mm Hg 36 4   PO2 ART mm Hg 142 2*   HCO3 ART mmol/L 27 6   BASE EXC ART mmol/L 4 3   O2 CONTENT ART mL/dL 17 1   O2 HGB, ARTERIAL % 98 4*   ABG SOURCE  Radial, Left         Results from last 7 days   Lab Units 05/23/21  2142   I STAT BASE EXC mmol/L -1   I STAT O2 SAT % 100*   ISTAT PH ART  7 426   I STAT ART PCO2 mm HG 34 6*   I STAT ART PO2 mm  0*   I STAT ART HCO3 mmol/L 22 8                         Results from last 7 days   Lab Units 05/24/21  0410 05/23/21  2048   PROCALCITONIN ng/ml 0 21 <0 05     Results from last 7 days   Lab Units 05/24/21  0406   LACTIC ACID mmol/L 1 2           Results from last 7 days   Lab Units 05/26/21  0616 05/23/21  1801 05/23/21  0122   BLOOD CULTURE  No Growth at 48 hrs  No Growth at 48 hrs  --  No Growth After 5 Days  No Growth After 5 Days  GRAM STAIN RESULT   --  No Polys or Bacteria seen  --    WOUND CULTURE   --  1+ Growth of Beta Hemolytic Streptococcus Group F*  1+ Growth of - Oral   --      Medications:   Scheduled Medications:  amoxicillin-clavulanate, 1 tablet, Oral, Q12H WILBERTO  enoxaparin, 40 mg, Subcutaneous, Q24H WILBERTO  famotidine, 20 mg, Intravenous, Daily  Continuous IV Infusions:     PRN Meds:  acetaminophen, 650 mg, Oral, Q6H PRN  HYDROmorphone, 0 2 mg, Intravenous, Q3H PRN  HYDROmorphone, 0 5 mg, Intravenous, Q3H PRN  ondansetron, 4 mg, Intravenous, Q6H PRN    Discharge Plan: tbd  Network Utilization Review Department  ATTENTION: Please call with any questions or concerns to [phone] and carefully listen to the prompts so that you are directed to the right person  All voicemails are confidential   Emeli Skipper all requests for admission clinical reviews, approved or denied determinations and any other requests to dedicated fax number below belonging to the campus where the patient is receiving treatment   List of dedicated fax numbers for the Facilities:  [address] DENIALS (Administrative/Medical Necessity) [phone]   [address] (Maternity/NICU/Pediatrics) 261 [facility] [address] Kt Guan [phone] [address] Bertha Chen 1481 [address] [facility] [address] [facility] [phone]

## 2021-05-28 NOTE — PROGRESS NOTES
Progress Note - Kia Sabianist 24 y o  male MRN: [id number]    Unit/Bed#: S -01 Encounter: [id number]      Oral and Maxillofacial Surgery Progress Note    Assessment:  24 y o  male postop day 4 status post incision and drainage right submandibular space, right sublingual space, right sub masseteric space, and right pterygoid mandibular space infection  Patient improving  Minimal discharge on Penrose drain today  Plan/Recs:  -drain removed  -continue IV Unasyn  -continue liquid diet  -OMS to follow      -----------------------------------------        Past Medical History:   Diagnosis Date    Asthma      Past Surgical History:   Procedure Laterality Date    EXAMINATION UNDER ANESTHESIA N/A 5/25/2021    Procedure: Airway exam with extubation;  Surgeon: Bennie Madrid MD;  [facility];  Service: General    INCISION AND DRAINAGE OF WOUND Right 5/23/2021    Procedure: INCISION AND DRAINAGE (I&D) HEAD/FACE, extraction of teeth 30, 31, 32    incision and drainage right submandibular, right sublingual, right submasseteric, and right pterygomandibular space infection;  Surgeon: Kennedi Silver DMD;  Location: [facility];  Service: Maxillofacial       No Known Allergies    Social History     Socioeconomic History    Marital status: /Civil Union     Spouse name: Not on file    Number of children: Not on file    Years of education: Not on file    Highest education level: Not on file   Occupational History    Not on file   Social Needs    Financial resource strain: Not on file    Food insecurity     Worry: Not on file     Inability: Not on file    Transportation needs     Medical: Not on file     Non-medical: Not on file   Tobacco Use    Smoking status: Never Smoker    Smokeless tobacco: Never Used   Substance and Sexual Activity    Alcohol use: Never     Frequency: Never    Drug use: Never    Sexual activity: Yes     Partners: Female   Lifestyle    Physical activity     Days per week: Not on file     Minutes per session: Not on file    Stress: Not on file   Relationships    Social connections     Talks on phone: Not on file     Gets together: Not on file     Attends Voodoo service: Not on file     Active member of club or organization: Not on file     Attends meetings of clubs or organizations: Not on file     Relationship status: Not on file    Intimate partner violence     Fear of current or ex partner: Not on file     Emotionally abused: Not on file     Physically abused: Not on file     Forced sexual activity: Not on file   Other Topics Concern    Not on file   Social History Narrative    Not on file       Scheduled Medications  Current Facility-Administered Medications   Medication Dose Route Frequency Provider Last Rate    acetaminophen  650 mg Oral Q6H PRN Millmont Munch, DO      ampicillin-sulbactam  3 g Intravenous Q6H Magdy Parlier, CRNP 3 g (05/27/21 1846)    enoxaparin  40 mg Subcutaneous Q24H [address] Regina L Walbert, CRNP      famotidine  20 mg Intravenous Daily Millmont Munch, DO      HYDROmorphone  0 2 mg Intravenous Q3H PRN Magdy Parlier, CRNP      HYDROmorphone  0 5 mg Intravenous Q3H PRN Magdy Parlier, CRNP      multi-electrolyte  75 mL/hr Intravenous Continuous Magdy Parlier, CRNP 75 mL/hr (05/27/21 0844)    ondansetron  4 mg Intravenous Q6H PRN Regina L Walbert, CRNP         PRN Medications    acetaminophen    HYDROmorphone    HYDROmorphone    ondansetron    Medication Infusions  multi-electrolyte, 75 mL/hr, Last Rate: 75 mL/hr (05/27/21 0844)        Vitals:    Temp:  [98 °F (36 7 °C)-99 6 °F (37 6 °C)] 98 5 °F (36 9 °C)  HR:  [66-79] 66  Resp:  [16-18] 16  BP: (128-134)/(68-74) 132/74  Wt Readings from Last 1 Encounters:   05/27/21 99 4 kg (219 lb 2 2 oz) (97 %, Z= 1 85)*     * Growth percentiles are based on CDC (Boys, 2-20 Years) data       Ht Readings from Last 1 Encounters:   05/22/21 5' 11" (1 803 m) (69 %, Z= 0 50)*     * Growth percentiles are based on CDC (Boys, 2-20 Years) data  Body mass index is 30 56 kg/m²  Respiratory    Lab Data (Last 4 hours)    None         O2/Vent Data (Last 4 hours)    None              Patient Lines/Drains/Airways Status    Active Airway     None              I/O:  Current Diet Order:        Diet Orders   (From admission, onward)             Start     Ordered    05/27/21 1155  Diet Surgical; Full Liquid  Diet effective now     Question Answer Comment   Diet Type Surgical    Surgical Full Liquid    RD to adjust diet per protocol? Yes        05/27/21 1154    05/23/21 1154  Room Service  Once     Question:  Type of Service  Answer:  Room Service - Appropriate with Assistance    05/23/21 1153                 Intake/Output Summary (Last 24 hours) at 5/27/2021 2004  Last data filed at 5/27/2021 1300  Gross per 24 hour   Intake 200 ml   Output 1000 ml   Net -800 ml       Labs:  Results from last 7 days   Lab Units 05/26/21  0616 05/25/21  0607 05/24/21  0406   WBC Thousand/uL 10 37* 10 29* 18 62*   HEMOGLOBIN g/dL 12 7 10 5* 12 4   HEMATOCRIT % 37 8 30 1* 36 1*   PLATELETS Thousands/uL 386 298 354     Results from last 7 days   Lab Units 05/26/21  0616 05/25/21  0607 05/24/21  0406 05/23/21  2142   POTASSIUM mmol/L 4 4 3 5 4 2  --    CHLORIDE mmol/L 104 107 106  --    CO2 mmol/L 24 24 23  --    CO2, I-STAT mmol/L  --   --   --  24   BUN mg/dL 8 13 13  --    CREATININE mg/dL 0 79 0 59* 0 89  --    GLUCOSE, ISTAT mg/dl  --   --   --  115   CALCIUM mg/dL 8 5 7 6* 8 2*  --              Pain Management Panel     There is no flowsheet data to display  Cultures and Sensitivites:  1+ growth of beta-hemolytic Streptococcus group F      Physical Exam:   Decreased right facial swelling  Continued trismus  Extraction sites hemostatic  Minimal drainage from extraoral Penrose drain  No submandibular fluctuance      Uriah Sauer, DMD

## 2021-05-28 NOTE — SPEECH THERAPY NOTE
Speech Language/Pathology    Speech/Language Pathology Progress Note    Patient Name: Cassy Love  Today's Date: 5/28/2021         Subjective:  Pt seen for dysphagia tx follow up, drain removed from neck last night, pt still c/o pain w/ neck mvt, does not try to move it much at all  Pt now c/o feeling stiches that bother him when he eats  Family at bedside  Pt stated he needed a walker and RN to help him to bathroom this am, he has not been out of bed this hospitalization  Pt c/o  having difficulty w/ bed mobility  Objective:  Pt seated upright, 2 pillows behind back for  Better positioning  Pt ate a few tsps of pudding, drank jael milk by straw  Cont w/  Trismus, difficulty w/ bolus retrieval from spoon  Pt agreeable to try cheerios soaked in milk  Pt only able to get 2 cheerios in mouth at a time  Slow, decreased mastication- c/o pain w/ mastication  Suspect slow transfer  Pt appeared w/ good oral control of liquids  swallows were timely and complete  Assessment:  Pt agreeable to try soft/solids, c/o some pain w/ chewing  No overt s/s aspiration    Plan/Recommendations:  Begin to try dysphagia 2 diet w/ thin liquids  Cont meds crushed or liquid form   Will cont to follow, pt is tent for discharge, contact info left at bedside if any questions or concerns after discharge       Jaswinder Cross CCC-SLP  Speech Pathologist  Available via  tiger text

## 2021-05-29 VITALS
OXYGEN SATURATION: 97 % | BODY MASS INDEX: 30.96 KG/M2 | TEMPERATURE: 98.2 F | HEART RATE: 71 BPM | RESPIRATION RATE: 15 BRPM | WEIGHT: 222 LBS | DIASTOLIC BLOOD PRESSURE: 56 MMHG | SYSTOLIC BLOOD PRESSURE: 109 MMHG

## 2021-05-29 PROBLEM — D72.829 LEUKOCYTOSIS: Status: RESOLVED | Noted: 2021-05-23 | Resolved: 2021-05-29

## 2021-05-29 PROBLEM — N48.30 PRIAPISM: Status: RESOLVED | Noted: 2021-05-24 | Resolved: 2021-05-29

## 2021-05-29 PROBLEM — J98.8 AIRWAY COMPROMISE: Status: RESOLVED | Noted: 2021-05-23 | Resolved: 2021-05-29

## 2021-05-29 PROBLEM — R13.12 OROPHARYNGEAL DYSPHAGIA: Status: ACTIVE | Noted: 2021-05-29

## 2021-05-29 PROCEDURE — 97161 PT EVAL LOW COMPLEX 20 MIN: CPT | Performed by: PHYSICAL THERAPIST

## 2021-05-29 PROCEDURE — 99239 HOSP IP/OBS DSCHRG MGMT >30: CPT | Performed by: INTERNAL MEDICINE

## 2021-05-29 RX ORDER — AMOXICILLIN AND CLAVULANATE POTASSIUM 875; 125 MG/1; MG/1
1 TABLET, FILM COATED ORAL EVERY 12 HOURS SCHEDULED
Qty: 10 TABLET | Refills: 0 | Status: SHIPPED | OUTPATIENT
Start: 2021-05-29 | End: 2021-06-03

## 2021-05-29 RX ORDER — IBUPROFEN 600 MG/1
600 TABLET ORAL EVERY 6 HOURS PRN
Qty: 20 TABLET | Refills: 0 | Status: SHIPPED | OUTPATIENT
Start: 2021-05-29

## 2021-05-29 RX ADMIN — ACETAMINOPHEN 650 MG: 650 SUSPENSION ORAL at 00:09

## 2021-05-29 RX ADMIN — AMOXICILLIN AND CLAVULANATE POTASSIUM 1 TABLET: 875; 125 TABLET, FILM COATED ORAL at 08:20

## 2021-05-29 NOTE — DISCHARGE INSTRUCTIONS
Dental Abscess   WHAT YOU NEED TO KNOW:   What is a dental abscess? A dental abscess is a collection of pus in or around a tooth  A dental abscess is caused by bacteria  The bacteria usually enter the tooth when the enamel (outer part of the tooth) is damaged by tooth decay  Bacteria may also enter the tooth through a break or chip in the tooth, or a cut in the gum  Food particles that are stuck between the teeth for a long time may also lead to an abscess  What increases my risk for a dental abscess? · Poor tooth care    · Medical conditions, such as diabetes, gastric reflux, or diseases that weaken the immune system     · Procedures on the tooth or the gums    · Dry mouth or very little saliva     · Smoking or drinking alcohol    · Radiation therapy of the head and neck    · Certain medicines, such as steroids, allergy, or blood pressure medicines    What are the signs and symptoms of a dental abscess? · Toothache, a loose tooth, or a tooth that is very sensitive to pressure or temperature    · Bad breath, unpleasant taste, and drooling    · Fever    · Pain, redness, and swelling of the gums, or swelling of your face and neck    · Pain when you open or close your mouth    · Trouble opening your mouth    How is a dental abscess diagnosed? Your healthcare provider will examine your teeth and gums  He or she will check for pus, redness, swelling, or a mass  You may need an x-ray to check for infection in deeper tissues or broken teeth  How is a dental abscess treated? Treatment helps treat your abscess and prevent more serious problems  · Medicines  may be given to treat a bacterial infection and decrease pain  · Incision and drainage  is a cut in the abscess to allow the pus to drain  A sample of fluid may be collected from your abscess  The fluid is sent to a lab and tested for bacteria  Ask your healthcare provider for more information      · A root canal  is a procedure to remove the bacteria and prevent more infection  It is usually done after an incision and drainage  A filling or crown will be placed over the tooth after you have healed from your root canal      · Tooth removal  may be needed if the infection affects deeper tissues  This is usually done after an incision and drainage  What can I do to care for myself? · Rinse your mouth every 2 hours with salt water  This will help keep the area clean  · Gently brush your teeth twice a day with a soft tooth brush  This will help keep the area clean  · Eat soft foods as directed  Soft foods may cause less pain  Examples include applesauce, yogurt, and cooked pasta  Ask your healthcare provider how long to follow this instruction  · Apply a warm compress to your tooth or gum  Use a cotton ball or gauze soaked in warm water  Remove the compress in 10 minutes or when it becomes cool  Repeat 3 times a day  What can I do to prevent another dental abscess? · Brush your teeth at least 2 times a day with fluoride toothpaste  · Use dental floss to clean between your teeth at least once a day  · Rinse your mouth with water or mouthwash after meals and snacks  · Chew sugarless gum after meals and snacks  · Limit foods that are sticky and high in sugar such as raisons  Also limit drinks high in sugar, such as soda  · See your dentist every 6 months for dental cleanings and oral exams  When should I seek immediate care? · You have severe pain  · You have trouble breathing because of pain or swelling  When should I contact my healthcare provider? · Your symptoms get worse, even after treatment  · Your mouth is bleeding  · You cannot eat or drink because of pain or swelling  · Your abscess returns  · You have an injury that causes a crack in your tooth  · You have questions or concerns about your condition or care  CARE AGREEMENT:   You have the right to help plan your care   Learn about your health condition and how it may be treated  Discuss treatment options with your healthcare providers to decide what care you want to receive  You always have the right to refuse treatment  The above information is an  only  It is not intended as medical advice for individual conditions or treatments  Talk to your doctor, nurse or pharmacist before following any medical regimen to see if it is safe and effective for you  © Copyright [address] Information is for End User's use only and may not be sold, redistributed or otherwise used for commercial purposes  All illustrations and images included in CareNotes® are the copyrighted property of A D A M , Inc  or [address]    Amoxicillin/Clavulanate Potassium (By mouth)   Amoxicillin (a-edf-i-VISHNU-in), Clavulanate Potassium (SKOD-py-ap-marcia ttg-ZZF-dw-um)  Treats infections  This medicine is a penicillin antibiotic  Brand Name(s): Augmentin, Augmentin ES-600, Augmentin XR   There may be other brand names for this medicine  When This Medicine Should Not Be Used: This medicine is not right for everyone  Do not use it if you had an allergic reaction to amoxicillin, clavulanate, or a similar antibiotic (penicillin or cephalosporin), or if you had liver problems caused by Augmentin®  How to Use This Medicine:   Liquid, Tablet, Chewable Tablet, Long Acting Tablet  · Your doctor will tell you how much medicine to use  Do not use more than directed  · Take this medicine with a snack or at the beginning of a meal to help prevent nausea  · Chewable tablets: Chew the tablet completely before you swallow it  · Measure the oral liquid medicine with a marked measuring spoon, oral syringe, or medicine cup  Shake the medicine well just before you measure each dose  Rinse the spoon or dropper after each use  · Swallow the extended-release tablet whole  Do not crush, break, or chew it    · Take all of the medicine in your prescription to clear up your infection, even if you feel better after the first few doses  · Missed dose: Take a dose as soon as you remember  If it is almost time for your next dose, wait until then and take a regular dose  Do not take extra medicine to make up for a missed dose  · Tablet, extended-release tablet, chewable tablet: Store at room temperature, away from heat, moisture, and direct light  · Oral liquid: Store in the refrigerator  Do not freeze  · Throw away any unused oral liquid after 10 days  Drugs and Foods to Avoid:   Ask your doctor or pharmacist before using any other medicine, including over-the-counter medicines, vitamins, and herbal products  · Some medicines can affect how this medicine works  Tell your doctor if you are taking a blood thinner (such as warfarin), allopurinol, or probenecid  Warnings While Using This Medicine:   · Tell your doctor if you are pregnant or breastfeeding, or if you have kidney disease, liver disease, or mononucleosis (mono)  · Birth control pills may not work as well while you are taking this medicine  Use another form of birth control to prevent pregnancy  · This medicine can cause diarrhea  Call your doctor if the diarrhea becomes severe, does not stop, or is bloody  Do not take any medicine to stop diarrhea until you have talked to your doctor  Diarrhea can occur 2 months or more after you stop taking this medicine  · Tell any doctor or dentist who treats you that you are using this medicine  This medicine may affect certain medical test results  · Call your doctor if your symptoms do not improve or if they get worse  · The chewable tablet and oral liquid contain phenylalanine  Talk to your doctor before you use this medicine if you have phenylketonuria (PKU)  · Keep all medicine out of the reach of children  Never share your medicine with anyone    Possible Side Effects While Using This Medicine:   Call your doctor right away if you notice any of these side effects:  · Allergic reaction: Itching or hives, swelling in your face or hands, swelling or tingling in your mouth or throat, chest tightness, trouble breathing  · Blistering, peeling, red skin rash  · Change in how much or how often you urinate  · Dark urine or pale stools, nausea, vomiting, loss of appetite, stomach pain, yellow eyes or skin  · Diarrhea that may contain blood, stomach cramps  If you notice these less serious side effects, talk with your doctor:   · Diaper rash  · Mild diarrhea, nausea, vomiting  · Tooth discoloration (in children)  If you notice other side effects that you think are caused by this medicine, tell your doctor  Call your doctor for medical advice about side effects  You may report side effects to FDA at 6-666-FDA-5708  © Copyright [employer] [facility] 2021 Information is for End User's use only and may not be sold, redistributed or otherwise used for commercial purposes  The above information is an  only  It is not intended as medical advice for individual conditions or treatments  Talk to your doctor, nurse or pharmacist before following any medical regimen to see if it is safe and effective for you

## 2021-05-29 NOTE — DISCHARGE SUMMARY
Discharge Summary - [facility]    Patient Information: Angel Buckner 24 y o  male MRN: [id number]  Unit/Bed#: S -01 Encounter: [id number]    Discharging Physician / Practitioner: Missy Alegre DO  PCP: Hayes Patten DO  Admission Date: 5/23/2021  Discharge Date: 05/29/21    Disposition:     Home    Reason for Admission: Dental abscess    Discharge Diagnoses:     Principal Problem:    Dental abscess  Active Problems:    Oropharyngeal dysphagia  Resolved Problems:    Leukocytosis    Airway compromise    Priapism    Consultations During Hospital Stay:  · Oral surgery    Procedures Performed:   · I&D of dental abscess with extraction of teeth 87,85,03 by OMFS  Penrose drain was placed at that time  Had prolonged intubation post operatively due to concerns for airway compromise  Was extubated on 05/25/2021  Penrose drain was removed 05/27/2021  Significant Findings / Test Results:     · CT soft tissue neck 05/14/2021 - 8 mm of the antigenic abscess along the right mandible at the level of the 1st molar tooth  · CT soft tissue neck 05/23/2021 - significant worsening since previous CT scan in the inflammatory change involving the right submandibular, sublingual, and lateral floor of the mouth with a new 3 6 x 2 6 x 4 2 cm abscess  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  Infection appears to be related to a sub cm odontogenic abscess along the right mandible at the level of the 1st molar tooth  Airway is overall patent  · Blood cultures negative on 05/23/2021 as well as 05/26/2021  · Wound culture 05/23/2021 grew beta-hemolytic Streptococcus group F  Anaerobic cultures were negative  · Metabolic profiles were grossly normal   · White blood cell count initially 23 65 but decreased down to 10 37 when last checked on 05/26/2021  Hemoglobin ranged between 10 5 and 13 5 during this admission with a final hemoglobin of 12 7    Platelet counts were normal   · Procalcitonin was between 0 05 and 0 21  Incidental Findings:   · None     Test Results Pending at Discharge (will require follow up): · None     Outpatient Tests Requested:  · None    Complications:  Prolonged intubation post procedure due to risk of airway compromise  Resolved  Hospital Course:     Jaelyn Hernandez is a 24 y o  male patient who originally presented to the hospital on 5/23/2021 due to right-sided dental pain noted be due to dental abscess  The patient had multiple ER visits previous and had been on outpatient oral antibiotics but did not have improvement  The patient had then been seen at the [facility] but was transferred to the [address] [place] [address] due to concern for Saul's angina  The patient was initiated on IV Unasyn and oral surgery was consulted  Decision was made to bring the patient to the OR for extraction of 3 teeth (30, 31, 32) with incision and drainage done on 05/23/2021  Wound culture from the procedure showed group F strep with susceptibility to the Unasyn that the patient was receiving  Blood cultures had all remain negative  During the procedure a Penrose drain was placed which drained externally from the right draw  By 05/26/2021 the drain was noted to have scant to no drainage and oral surgery was able to then remove this Penrose drain on 05/27/2021  The patient's surgical course was complicated by concern for airway obstruction with already present edema appearing worse postprocedure  With concern for airway compromise, patient was maintained in an intubated state from the time of procedure up until he was able to be successfully extubated on 05/25/2021  The patient was then maintained in the ICU for an additional day before being transferred out to the medical floors  Warm compresses were recommended to the right side of the face and neck  Patient's pain was controlled with Tylenol and sparing doses of IV Dilaudid    The patient was seen by speech pathology and initially was NPO then advanced to a full liquid diet and yesterday advanced to a dysphagia 2 diet with thin liquids  Yesterday we also switch the patient from Unasyn to Augmentin with the recommendation by oral surgery to monitor the patient in-patient for a day on the Augmentin  As the patient continues to clinically improve with the Augmentin it was felt that he could be discharged today  The patient instructed to gradually increase his diet as tolerated  Currently, the patient is doing well in terms of his oxygenation with room air saturations of 95-98%  On 05/20/2021 the patient seemed generally weak after his stay in the ICU but with some motivation and assistance from nursing the patient was able to improve his mobility to the point that no additional rehab would be needed and the patient can discharge home today  Of note, patient's hospital course was complicated by priapism of uncertain etiology  This required urology consult with procedure of a dorsal nerve block to the penis with percutaneous drainage of priapism on 05/24/2021  The patient then had no recurrence of priapism since then  Transiently patient required a Zelaya catheter but this was successfully removed on 05/27/2021  The patient has had no problems with urination since then  At this point, we do feel the patient is in stable improved condition and able to be safely discharged from the hospital   The patient is in full agreement with the plan for discharge today  The patient should follow-up with primary care physician within about 1 week  He will only need to follow-up with Oral surgery if there is any additional concerns or complications but no scheduled follow-up is made at the time of discharge  Condition at Discharge: stable     Discharge Day Visit / Exam:     Subjective: The patient feels that pain is controlled currently  He has had no fevers or chills  The patient is swallowing okay  No difficulties with the dysphagia 2 diet  The patient denies any shortness of breath and has not heard any wheezing or stridor  The patient feels like he is ready to discharge today  He has been ambulating better last night and this morning  No dizziness or lightheadedness  No bowel troubles with the antibiotics  Vitals: Blood Pressure: 109/56 (05/29/21 0742)  Pulse: 71 (05/29/21 0742)  Temperature: 98 2 °F (36 8 °C) (05/29/21 0742)  Temp Source: Oral (05/29/21 0742)  Respirations: 15 (05/29/21 0742)  Weight - Scale: 101 kg (222 lb 0 1 oz) (05/28/21 0554)  SpO2: 97 % (05/29/21 0742)  Exam:   Physical Exam  Vitals signs reviewed  Constitutional:       Comments: Sitting in the chair today  HENT:      Mouth/Throat:      Mouth: Mucous membranes are moist       Pharynx: Oropharynx is clear  Comments: No exudates noted  No pharyngeal erythema  Dental extraction sites appear to be healing and intact  The patient has slightly better range of motion with his jaw  The jaw self seems slightly less tender and has less edema  No significant erythema or warmth over the area where the Penrose drain was previously  Cardiovascular:      Rate and Rhythm: Normal rate and regular rhythm  Heart sounds: No murmur  Pulmonary:      Effort: Pulmonary effort is normal       Breath sounds: No stridor  No wheezing, rhonchi or rales  Abdominal:      General: Bowel sounds are normal  There is no distension  Palpations: Abdomen is soft  Tenderness: There is no abdominal tenderness  Musculoskeletal:         General: No swelling  Skin:     General: Skin is warm and dry  Coloration: Skin is not pale  Findings: No rash  Neurological:      General: No focal deficit present  Mental Status: He is alert and oriented to person, place, and time  Discussion with Family: Declined update to family again today      Discharge instructions/Information to patient and family:   See after visit summary for information provided to patient and family  Provisions for Follow-Up Care:  See after visit summary for information related to follow-up care and any pertinent home health orders  Planned Readmission: None     Discharge Statement:  I spent 32 minutes discharging the patient  This time was spent on the day of discharge  I had direct contact with the patient on the day of discharge  Greater than 50% of the total time was spent examining patient, answering all patient questions, arranging and discussing plan of care with patient as well as directly providing post-discharge instructions  Additional time then spent on discharge activities  Discharge Medications:  See after visit summary for reconciled discharge medications provided to patient and family        ** Please Note: This note has been constructed using a voice recognition system **

## 2021-05-29 NOTE — PLAN OF CARE
Problem: PAIN - ADULT  Goal: Verbalizes/displays adequate comfort level or baseline comfort level  Description: Interventions:  - Encourage patient to monitor pain and request assistance  - Assess pain using appropriate pain scale  - Administer analgesics based on type and severity of pain and evaluate response  - Implement non-pharmacological measures as appropriate and evaluate response  - Consider cultural and social influences on pain and pain management  - Notify physician/advanced practitioner if interventions unsuccessful or patient reports new pain  Outcome: Progressing     Problem: INFECTION - ADULT  Goal: Absence or prevention of progression during hospitalization  Description: INTERVENTIONS:  - Assess and monitor for signs and symptoms of infection  - Monitor lab/diagnostic results  - Monitor all insertion sites, i e  indwelling lines, tubes, and drains  - Monitor endotracheal if appropriate and nasal secretions for changes in amount and color  - [place] appropriate cooling/warming therapies per order  - Administer medications as ordered  - Instruct and encourage patient and family to use good hand hygiene technique  - Identify and instruct in appropriate isolation precautions for identified infection/condition  Outcome: Progressing  Goal: Absence of fever/infection during neutropenic period  Description: INTERVENTIONS:  - Monitor WBC    Outcome: Progressing     Problem: SAFETY ADULT  Goal: Patient will remain free of falls  Description: INTERVENTIONS:  - Assess patient frequently for physical needs  -  Identify cognitive and physical deficits and behaviors that affect risk of falls    -  [place] fall precautions as indicated by assessment   - Educate patient/family on patient safety including physical limitations  - Instruct patient to call for assistance with activity based on assessment  - Modify environment to reduce risk of injury  - Consider OT/PT consult to assist with strengthening/mobility  Outcome: Progressing  Goal: Maintain or return to baseline ADL function  Description: INTERVENTIONS:  -  Assess patient's ability to carry out ADLs; assess patient's baseline for ADL function and identify physical deficits which impact ability to perform ADLs (bathing, care of mouth/teeth, toileting, grooming, dressing, etc )  - Assess/evaluate cause of self-care deficits   - Assess range of motion  - Assess patient's mobility; develop plan if impaired  - Assess patient's need for assistive devices and provide as appropriate  - Encourage maximum independence but intervene and supervise when necessary  - Involve family in performance of ADLs  - Assess for home care needs following discharge   - Consider OT consult to assist with ADL evaluation and planning for discharge  - Provide patient education as appropriate  Outcome: Progressing  Goal: Maintain or return mobility status to optimal level  Description: INTERVENTIONS:  - Assess patient's baseline mobility status (ambulation, transfers, stairs, etc )    - Identify cognitive and physical deficits and behaviors that affect mobility  - Identify mobility aids required to assist with transfers and/or ambulation (gait belt, sit-to-stand, lift, walker, cane, etc )  - [place] fall precautions as indicated by assessment  - Record patient progress and toleration of activity level on Mobility SBAR; progress patient to next Phase/Stage  - Instruct patient to call for assistance with activity based on assessment  - Consider rehabilitation consult to assist with strengthening/weightbearing, etc   Outcome: Progressing     Problem: DISCHARGE PLANNING  Goal: Discharge to home or other facility with appropriate resources  Description: INTERVENTIONS:  - Identify barriers to discharge w/patient and caregiver  - Arrange for needed discharge resources and transportation as appropriate  - Identify discharge learning needs (meds, wound care, etc )  - Arrange for interpretive services to assist at discharge as needed  - Refer to Case Management Department for coordinating discharge planning if the patient needs post-hospital services based on physician/advanced practitioner order or complex needs related to functional status, cognitive ability, or social support system  Outcome: Progressing     Problem: Knowledge Deficit  Goal: Patient/family/caregiver demonstrates understanding of disease process, treatment plan, medications, and discharge instructions  Description: Complete learning assessment and assess knowledge base    Interventions:  - Provide teaching at level of understanding  - Provide teaching via preferred learning methods  Outcome: Progressing     Problem: SKIN/TISSUE INTEGRITY - ADULT  Goal: Skin integrity remains intact  Description: INTERVENTIONS  - Identify patients at risk for skin breakdown  - Assess and monitor skin integrity  - Assess and monitor nutrition and hydration status  - Monitor labs (i e  albumin)  - Assess for incontinence   - Turn and reposition patient  - Assist with mobility/ambulation  - Relieve pressure over bony prominences  - Avoid friction and shearing  - Provide appropriate hygiene as needed including keeping skin clean and dry  - Evaluate need for skin moisturizer/barrier cream  - Collaborate with interdisciplinary team (i e  Nutrition, Rehabilitation, etc )   - Patient/family teaching  Outcome: Progressing  Goal: Incision(s), wounds(s) or drain site(s) healing without S/S of infection  Description: INTERVENTIONS  - Assess and document risk factors for skin impairment   - Assess and document dressing, incision, wound bed, drain sites and surrounding tissue  - Consider nutrition services referral as needed  - Oral mucous membranes remain intact  - Provide patient/ family education  Outcome: Progressing  Goal: Oral mucous membranes remain intact  Description: INTERVENTIONS  - Assess oral mucosa and hygiene practices  - Implement preventative oral hygiene regimen  - Implement oral medicated treatments as ordered  - Initiate Nutrition services referral as needed  Outcome: Progressing     Problem: Nutrition/Hydration-ADULT  Goal: Nutrient/Hydration intake appropriate for improving, restoring or maintaining nutritional needs  Description: Monitor and assess patient's nutrition/hydration status for malnutrition  Collaborate with interdisciplinary team and initiate plan and interventions as ordered  Monitor patient's weight and dietary intake as ordered or per policy  Utilize nutrition screening tool and intervene as necessary  Determine patient's food preferences and provide high-protein, high-caloric foods as appropriate       INTERVENTIONS:  - Monitor oral intake, urinary output, labs, and treatment plans  - Assess nutrition and hydration status and recommend course of action  - Evaluate amount of meals eaten  - Assist patient with eating if necessary   - Allow adequate time for meals  - Recommend/ encourage appropriate diets, oral nutritional supplements, and vitamin/mineral supplements  - Order, calculate, and assess calorie counts as needed  - Recommend, monitor, and adjust tube feedings and TPN/PPN based on assessed needs  - Assess need for intravenous fluids  - Provide specific nutrition/hydration education as appropriate  - Include patient/family/caregiver in decisions related to nutrition  Outcome: Progressing     Problem: Prexisting or High Potential for Compromised Skin Integrity  Goal: Skin integrity is maintained or improved  Description: INTERVENTIONS:  - Identify patients at risk for skin breakdown  - Assess and monitor skin integrity  - Assess and monitor nutrition and hydration status  - Monitor labs   - Assess for incontinence   - Turn and reposition patient  - Assist with mobility/ambulation  - Relieve pressure over bony prominences  - Avoid friction and shearing  - Provide appropriate hygiene as needed including keeping skin clean and dry  - Evaluate need for skin moisturizer/barrier cream  - Collaborate with interdisciplinary team   - Patient/family teaching  - Consider wound care consult   Outcome: Progressing     Problem: Potential for Falls  Goal: Patient will remain free of falls  Description: INTERVENTIONS:  - Assess patient frequently for physical needs  -  Identify cognitive and physical deficits and behaviors that affect risk of falls    -  [place] fall precautions as indicated by assessment   - Educate patient/family on patient safety including physical limitations  - Instruct patient to call for assistance with activity based on assessment  - Modify environment to reduce risk of injury  - Consider OT/PT consult to assist with strengthening/mobility  Outcome: Progressing

## 2021-05-29 NOTE — DISCHARGE INSTR - AVS FIRST PAGE
Dear Denisha Neely,     It was our pleasure to care for you here at [facility]  It is our hope that we were always able to exceed the expected standards for your care during your stay  You were hospitalized due to dental abscess with facial cellulitis  You were cared for in the ICU and on the 3rd floor under the service of Ana Escalante DO with the [facility] who covers for your primary care physician (PCP), Carolina Forde DO, while you were hospitalized  If you have any questions or concerns related to this hospitalization, you may contact us at [phone]  For follow up as well as medication refills, we recommend that you follow up with your primary care physician  A registered nurse will reach out to you by phone within a few days after your discharge to answer any additional questions that you may have after going home  However, at this time we provide for you here, the most important instructions / recommendations at discharge:     · Notable Medication Adjustments -   · Continue on antibiotic Augmentin for 5 more days to finish 10 days total antibiotic treatment  · Ibuprofen may be taken at a dose of 600 mg every 6 hours as needed for pain  · Tylenol may be helpful for milder pains  · Testing Required after Discharge -   · None  · Important follow up information -   · Follow up with PCP within next 1 week  · You would only need to follow up with oral surgeon if there are any concerns related to prior drain site or dental extraction sites  · Other Instructions -   · Please return immediately if you develop trouble swallowing, shortness of breath / wheezing, or if you have fevers > 101 or if you develop any drastic increase in pain / swelling  · Rinse well after eating  · Avoid brushing teeth in the area of extractions for 1 more day (this will place you 7 days post operative)    · Contact PCP if you have any trouble with stomach upset of diarrhea with the antibiotic  · Remain on softer foods through the weekend, but then you can gradually start to increase to more normal / solid foods as tolerated  · Please review this entire after visit summary as additional general instructions including medication list, appointments, activity, diet, any pertinent wound care, and other additional recommendations from your care team that may be provided for you        Sincerely,     Mechelle Oropeza, DO

## 2021-05-29 NOTE — PHYSICAL THERAPY NOTE
PHYSICAL THERAPY EVALUATION  NAME:  Seda Rod  DATE: 05/29/21    AGE:   24 y o   Mrn:   [id number]  ADMIT DX:  Dental abscess [K04 [id number]]  Problem List:   Patient Active Problem List   Diagnosis    Dental abscess    Oropharyngeal dysphagia         Length Of Stay: 6  Performed at least 2 patient identifiers during session: Name and ID kapil  PHYSICAL THERAPY EVALUATION :        05/29/21 1141   PT Last Visit   PT Visit Date 05/29/21   Note Type   Note type Evaluation   Pain Assessment   Pain Assessment Tool 0-10   Pain Score 3   Pain Location/Orientation Other (Comment)  (jaw)   Home Living   Type of [address] [place] [address] Two level;Bed/bath upstairs;Stairs to enter with rails   Bathroom Shower/Tub Tub/shower unit   Bathroom Toilet Standard   Bathroom Equipment Grab bars in shower   Additional Comments Has powder room on first floor    Prior Function   Level of [place] Independent with ADLs and functional mobility   Lives With Family  (wife and step dad )   Receives Help From Family   ADL Assistance Independent   IADLs Independent   Falls in the last 6 months 0   Vocational Unemployed   Comments + , not working  Pt libes with step father and wife, he states someone will always be home with him during the day and availabl to drive him to appts   Restrictions/Precautions   Weight Bearing Precautions Per Order No   Other Precautions Fall Risk;Pain   General   Family/Caregiver Present Yes  (family came in at end of session)   Cognition   Overall Cognitive Status WFL   Arousal/Participation Lethargic   Orientation Level Oriented X4   Following Commands Follows multistep commands without difficulty   RUE Assessment   RUE Assessment WFL   LUE Assessment   LUE Assessment WFL   RLE Assessment   RLE Assessment WFL   LLE Assessment   LLE Assessment WFL   Bed Mobility   Additional Comments not assessed   Patient was seated in bedside chair upon arrival   Transfers   Sit to Stand 6  Modified independent Additional items Armrests; Increased time required   Stand to Sit 6  Modified independent   Stand pivot 6  Modified independent   Ambulation/Elevation   Gait pattern Narrow VISHAL; Inconsistent jacques; Short stride; Excessively slow   Gait Assistance 5  Supervision   Assistive Device None   Distance 35', 5ft x 2, 40 feet    Stair Management Assistance 5  Supervision   Stair Management Technique Two rails; Step to pattern  (using step stool)   Number of Stairs 12   Balance   Static Sitting Good   Dynamic Sitting Good   Static Standing Fair +   Ambulatory Fair   Endurance Deficit   Endurance Deficit Yes   Activity Tolerance   Activity Tolerance Patient tolerated treatment well   Medical Staff Made Aware yes,    Nurse Made Aware yes, RN Jero Wolf   Assessment   Prognosis Good   Problem List Decreased strength;Decreased endurance; Impaired balance;Decreased skin integrity;Pain   Barriers to Discharge None   Goals   Patient Goals to get moving more   Plan   Treatment/Interventions   (DC IP PT)   PT Frequency   (DC IP PT )   Recommendation   PT Discharge Recommendation Home with outpatient rehabilitation   PT - OK to Discharge Yes   Additional Comments when medically cleared    AM-PAC Basic Mobility Inpatient   Turning in Bed Without Bedrails 4   Lying on Back to Sitting on Edge of Flat Bed 4   Moving Bed to Chair 4   Standing Up From Chair 4   Walk in Room 4   Climb 3-5 Stairs 4   Basic Mobility Inpatient Raw Score 24   Basic Mobility Standardized Score 57 68   Barthel Index   Feeding 5   Bathing 5   Grooming Score 5   Dressing Score 10   Bladder Score 10   Bowels Score 10   Toilet Use Score 10   Transfers (Bed/Chair) Score 15   Mobility (Level Surface) Score 0   Stairs Score 10   Barthel Index Score 80     (Please find full objective findings from PT assessment regarding body systems outlined above)  Assessment: Pt is a 24 y o  male seen for PT evaluation s/p admit to [facility] on 5/23/2021 w/ Dental abscess    Order placed for PT  Upon evaluation: Pt requires [place] assistance for bed mobility and transfers and supervision assistance for ambulation with no AD  Pt's clinical presentation is currently evolving given the functional mobility deficits above, especially (but not limited to) weakness, decreased skin integrity, decreased endurance, gait deviations, pain and decreased activity tolerance  Pt demonstrates ability to transfer and ambulate safely and will have family at home at all times for supervision  He feels safe to be DC at this time with referral for outpatient physical therapy to further improve balance and endurance  DC IP PT   The patient's AM-PAC Basic Mobility Inpatient Short Form Raw Score is 24  , Standardized Score is 57 68    A standardized score greater than 42 9 suggests the patient may benefit from discharge to home, which  coincide with above PT recommendations  However please refer to therapist recommendation for discharge planning given other factors that may influence destination  Adapted from Tiffany Lopez Use of 6-clicks to Provide Decision Support in the [facility] Setting  [address], [place]  [facility]  Comorbidities affecting pt's physical performance at time of assessment include:   has a past medical history of Asthma ,  has a past surgical history that includes Incision and drainage of wound (Right, 5/23/2021) and Examination under anesthesia (N/A, 5/25/2021)        Izabela Lopez, PT

## 2021-05-29 NOTE — PLAN OF CARE
Problem: PAIN - ADULT  Goal: Verbalizes/displays adequate comfort level or baseline comfort level  Description: Interventions:  - Encourage patient to monitor pain and request assistance  - Assess pain using appropriate pain scale  - Administer analgesics based on type and severity of pain and evaluate response  - Implement non-pharmacological measures as appropriate and evaluate response  - Consider cultural and social influences on pain and pain management  - Notify physician/advanced practitioner if interventions unsuccessful or patient reports new pain  Outcome: Adequate for Discharge     Problem: INFECTION - ADULT  Goal: Absence or prevention of progression during hospitalization  Description: INTERVENTIONS:  - Assess and monitor for signs and symptoms of infection  - Monitor lab/diagnostic results  - Monitor all insertion sites, i e  indwelling lines, tubes, and drains  - Monitor endotracheal if appropriate and nasal secretions for changes in amount and color  - [place] appropriate cooling/warming therapies per order  - Administer medications as ordered  - Instruct and encourage patient and family to use good hand hygiene technique  - Identify and instruct in appropriate isolation precautions for identified infection/condition  Outcome: Adequate for Discharge  Goal: Absence of fever/infection during neutropenic period  Description: INTERVENTIONS:  - Monitor WBC    Outcome: Adequate for Discharge     Problem: SAFETY ADULT  Goal: Patient will remain free of falls  Description: INTERVENTIONS:  - Assess patient frequently for physical needs  -  Identify cognitive and physical deficits and behaviors that affect risk of falls    -  [place] fall precautions as indicated by assessment   - Educate patient/family on patient safety including physical limitations  - Instruct patient to call for assistance with activity based on assessment  - Modify environment to reduce risk of injury  - Consider OT/PT consult to assist with strengthening/mobility  Outcome: Adequate for Discharge  Goal: Maintain or return to baseline ADL function  Description: INTERVENTIONS:  -  Assess patient's ability to carry out ADLs; assess patient's baseline for ADL function and identify physical deficits which impact ability to perform ADLs (bathing, care of mouth/teeth, toileting, grooming, dressing, etc )  - Assess/evaluate cause of self-care deficits   - Assess range of motion  - Assess patient's mobility; develop plan if impaired  - Assess patient's need for assistive devices and provide as appropriate  - Encourage maximum independence but intervene and supervise when necessary  - Involve family in performance of ADLs  - Assess for home care needs following discharge   - Consider OT consult to assist with ADL evaluation and planning for discharge  - Provide patient education as appropriate  Outcome: Adequate for Discharge  Goal: Maintain or return mobility status to optimal level  Description: INTERVENTIONS:  - Assess patient's baseline mobility status (ambulation, transfers, stairs, etc )    - Identify cognitive and physical deficits and behaviors that affect mobility  - Identify mobility aids required to assist with transfers and/or ambulation (gait belt, sit-to-stand, lift, walker, cane, etc )  - [place] fall precautions as indicated by assessment  - Record patient progress and toleration of activity level on Mobility SBAR; progress patient to next Phase/Stage  - Instruct patient to call for assistance with activity based on assessment  - Consider rehabilitation consult to assist with strengthening/weightbearing, etc   Outcome: Adequate for Discharge     Problem: DISCHARGE PLANNING  Goal: Discharge to home or other facility with appropriate resources  Description: INTERVENTIONS:  - Identify barriers to discharge w/patient and caregiver  - Arrange for needed discharge resources and transportation as appropriate  - Identify discharge learning needs (meds, wound care, etc )  - Arrange for interpretive services to assist at discharge as needed  - Refer to Case Management Department for coordinating discharge planning if the patient needs post-hospital services based on physician/advanced practitioner order or complex needs related to functional status, cognitive ability, or social support system  Outcome: Adequate for Discharge     Problem: Knowledge Deficit  Goal: Patient/family/caregiver demonstrates understanding of disease process, treatment plan, medications, and discharge instructions  Description: Complete learning assessment and assess knowledge base    Interventions:  - Provide teaching at level of understanding  - Provide teaching via preferred learning methods  Outcome: Adequate for Discharge     Problem: SKIN/TISSUE INTEGRITY - ADULT  Goal: Skin integrity remains intact  Description: INTERVENTIONS  - Identify patients at risk for skin breakdown  - Assess and monitor skin integrity  - Assess and monitor nutrition and hydration status  - Monitor labs (i e  albumin)  - Assess for incontinence   - Turn and reposition patient  - Assist with mobility/ambulation  - Relieve pressure over bony prominences  - Avoid friction and shearing  - Provide appropriate hygiene as needed including keeping skin clean and dry  - Evaluate need for skin moisturizer/barrier cream  - Collaborate with interdisciplinary team (i e  Nutrition, Rehabilitation, etc )   - Patient/family teaching  Outcome: Adequate for Discharge  Goal: Incision(s), wounds(s) or drain site(s) healing without S/S of infection  Description: INTERVENTIONS  - Assess and document risk factors for skin impairment   - Assess and document dressing, incision, wound bed, drain sites and surrounding tissue  - Consider nutrition services referral as needed  - Oral mucous membranes remain intact  - Provide patient/ family education  Outcome: Adequate for Discharge  Goal: Oral mucous membranes remain intact  Description: INTERVENTIONS  - Assess oral mucosa and hygiene practices  - Implement preventative oral hygiene regimen  - Implement oral medicated treatments as ordered  - Initiate Nutrition services referral as needed  Outcome: Adequate for Discharge     Problem: Nutrition/Hydration-ADULT  Goal: Nutrient/Hydration intake appropriate for improving, restoring or maintaining nutritional needs  Description: Monitor and assess patient's nutrition/hydration status for malnutrition  Collaborate with interdisciplinary team and initiate plan and interventions as ordered  Monitor patient's weight and dietary intake as ordered or per policy  Utilize nutrition screening tool and intervene as necessary  Determine patient's food preferences and provide high-protein, high-caloric foods as appropriate       INTERVENTIONS:  - Monitor oral intake, urinary output, labs, and treatment plans  - Assess nutrition and hydration status and recommend course of action  - Evaluate amount of meals eaten  - Assist patient with eating if necessary   - Allow adequate time for meals  - Recommend/ encourage appropriate diets, oral nutritional supplements, and vitamin/mineral supplements  - Order, calculate, and assess calorie counts as needed  - Recommend, monitor, and adjust tube feedings and TPN/PPN based on assessed needs  - Assess need for intravenous fluids  - Provide specific nutrition/hydration education as appropriate  - Include patient/family/caregiver in decisions related to nutrition  Outcome: Adequate for Discharge     Problem: Prexisting or High Potential for Compromised Skin Integrity  Goal: Skin integrity is maintained or improved  Description: INTERVENTIONS:  - Identify patients at risk for skin breakdown  - Assess and monitor skin integrity  - Assess and monitor nutrition and hydration status  - Monitor labs   - Assess for incontinence   - Turn and reposition patient  - Assist with mobility/ambulation  - Relieve pressure over bony prominences  - Avoid friction and shearing  - Provide appropriate hygiene as needed including keeping skin clean and dry  - Evaluate need for skin moisturizer/barrier cream  - Collaborate with interdisciplinary team   - Patient/family teaching  - Consider wound care consult   Outcome: Adequate for Discharge     Problem: Potential for Falls  Goal: Patient will remain free of falls  Description: INTERVENTIONS:  - Assess patient frequently for physical needs  -  Identify cognitive and physical deficits and behaviors that affect risk of falls    -  [place] fall precautions as indicated by assessment   - Educate patient/family on patient safety including physical limitations  - Instruct patient to call for assistance with activity based on assessment  - Modify environment to reduce risk of injury  - Consider OT/PT consult to assist with strengthening/mobility  Outcome: Adequate for Discharge

## 2021-05-31 LAB
BACTERIA BLD CULT: NORMAL
BACTERIA BLD CULT: NORMAL

## 2021-06-01 NOTE — UTILIZATION REVIEW
Notification of Discharge   This is a Notification of Discharge from our facility [address]  Please be advised that this patient has been discharge from our facility  Below you will find the admission and discharge date and time including the patients disposition  UTILIZATION REVIEW CONTACT:  Otilia Medina  Utilization   Network Utilization Review Department  Phone: [phone] x carefully listen to the prompts  All voicemails are confidential   Email: [email or web address]     PHYSICIAN ADVISORY SERVICES:  FOR [id number] REVIEW - MEDICAL NECESSITY DENIAL  Phone: [phone]  Fax: [phone]  Email: [email or web address]     PRESENTATION DATE: 5/23/2021 10:02 AM  OBERVATION ADMISSION DATE:   INPATIENT ADMISSION DATE: 5/23/21 1002   DISCHARGE DATE: 5/29/2021  2:32 PM  DISPOSITION: Home/Self Care Home/Self Care      IMPORTANT INFORMATION:  Send all requests for admission clinical reviews, approved or denied determinations and any other requests to dedicated fax number below belonging to the campus where the patient is receiving treatment   List of dedicated fax numbers:  [address] DENIALS (Administrative/Medical Necessity) [phone]   [address] (Maternity/NICU/Pediatrics) [phone]   Satnam Spencer [phone]   Danielle Yang [phone]   [facility] [phone]   Serafin Dove [facility] [address] [facility] [phone]   [facility]  [phone]   [address] [facility]  [address] [facility] [address] [facility] [phone]

## 2021-06-03 NOTE — TELEPHONE ENCOUNTER
Called patient x3  No answer and was unable to leave a voicemail  Attempt to reach letter mailed to patient and scanned into patients chart

## 2021-06-08 NOTE — TELEPHONE ENCOUNTER
CALLED AND SPOKE TO WIFE(UMBERTO) AND INFORMED HER THAT DUONG NEEDED AN INS REFERRAL FOR HIS OFFICE VISIT ON 6/22/21  HE WAS IN THE BACKGROUND AND SHE EXPLAINED TO HIM ABOUT THE REFERRAL AND THEN SHE HUNG UP THE PHONE ON ME  PT IS RESPONSIBLE TO GET INS REFERRAL OTHERWISE, APPT NEEDS TO BE RESCHEDULED

## 2021-06-15 ENCOUNTER — PATIENT OUTREACH (OUTPATIENT)
Dept: CASE MANAGEMENT | Facility: HOSPITAL | Age: 21
End: 2021-06-15

## 2021-06-15 NOTE — PROGRESS NOTES
Outpatient Care Management Note:  Patient was identified via report as having several recent ED visits in May at [facility] and an admission at [facility] all relating to dental abscesses  Outpatient outreach by providers has been unsuccessful so far  Telephone outreach attempt #1 made to introduce self and role of care management, follow up on general health, and outreach for any possible medical or psychosocial services needed  No answer  Mailbox is full and unable to leave message

## 2021-06-18 ENCOUNTER — PATIENT OUTREACH (OUTPATIENT)
Dept: CASE MANAGEMENT | Facility: HOSPITAL | Age: 21
End: 2021-06-18

## 2021-06-18 NOTE — PROGRESS NOTES
Outpatient Care Management Note:  Patient was identified via report as having several recent ED visits in May at [address] and an admission at [facility] all relating to dental abscesses  Outpatient outreach by providers has been unsuccessful so far  Telephone outreach attempt #2 made to introduce self and role of care management, follow up on general health, and outreach for any possible medical or psychosocial services needed  No answer  Mailbox is full and unable to leave message

## 2021-06-23 ENCOUNTER — PATIENT OUTREACH (OUTPATIENT)
Dept: CASE MANAGEMENT | Facility: HOSPITAL | Age: 21
End: 2021-06-23

## 2021-06-23 NOTE — PROGRESS NOTES
Outpatient Care Management Note:  Patient was identified via report as having several recent ED visits in May at [address] and an admission at [facility] all relating to dental abscesses  Outpatient outreach by providers has been unsuccessful so far  Telephone outreach attempt #3 made to introduce self and role of care management, follow up on general health, and outreach for any possible medical or psychosocial services needed  No answer  Left voicemail message with general contact information with name, title, phone number and days / times when I am available  I have been unable to reach this patient by phone  ED followup episode will be closed at this time  A letter is being sent to the last known home address

## 2021-11-19 ENCOUNTER — HOSPITAL ENCOUNTER (EMERGENCY)
Facility: HOSPITAL | Age: 21
Discharge: HOME | End: 2021-11-19
Attending: EMERGENCY MEDICINE
Payer: COMMERCIAL

## 2021-11-19 ENCOUNTER — APPOINTMENT (EMERGENCY)
Dept: RADIOLOGY | Facility: HOSPITAL | Age: 21
End: 2021-11-19
Payer: COMMERCIAL

## 2021-11-19 VITALS
OXYGEN SATURATION: 99 % | RESPIRATION RATE: 18 BRPM | BODY MASS INDEX: 31.92 KG/M2 | HEART RATE: 64 BPM | WEIGHT: 228.84 LBS | DIASTOLIC BLOOD PRESSURE: 67 MMHG | SYSTOLIC BLOOD PRESSURE: 120 MMHG | TEMPERATURE: 98 F

## 2021-11-19 DIAGNOSIS — M94.0 COSTOCHONDRITIS: Primary | ICD-10-CM

## 2021-11-19 PROCEDURE — 99285 EMERGENCY DEPT VISIT HI MDM: CPT | Performed by: EMERGENCY MEDICINE

## 2021-11-19 PROCEDURE — 93005 ELECTROCARDIOGRAM TRACING: CPT

## 2021-11-19 PROCEDURE — 71045 X-RAY EXAM CHEST 1 VIEW: CPT

## 2021-11-19 PROCEDURE — 99283 EMERGENCY DEPT VISIT LOW MDM: CPT

## 2021-11-19 RX ORDER — IBUPROFEN 600 MG/1
600 TABLET ORAL ONCE
Status: COMPLETED | OUTPATIENT
Start: 2021-11-19 | End: 2021-11-19

## 2021-11-19 RX ADMIN — IBUPROFEN 600 MG: 600 TABLET ORAL at 03:37

## 2021-11-22 LAB
ATRIAL RATE: 74 BPM
P AXIS: 70 DEGREES
PR INTERVAL: 162 MS
QRS AXIS: 63 DEGREES
QRSD INTERVAL: 106 MS
QT INTERVAL: 392 MS
QTC INTERVAL: 435 MS
T WAVE AXIS: 19 DEGREES
VENTRICULAR RATE: 74 BPM

## 2021-11-22 PROCEDURE — 93010 ELECTROCARDIOGRAM REPORT: CPT | Performed by: INTERNAL MEDICINE

## (undated) DEVICE — DENTAL BURR SIDE WHITE

## (undated) DEVICE — PACK PBDS MANDIBLE RF

## (undated) DEVICE — GLOVE INDICATOR PI UNDERGLOVE SZ 8 BLUE

## (undated) DEVICE — ASTOUND STANDARD SURGICAL GOWN, XL: Brand: CONVERTORS

## (undated) DEVICE — TIBURON SPLIT SHEET: Brand: CONVERTORS

## (undated) DEVICE — BETHLEHEM UNIVERSAL OUTPATIENT: Brand: CARDINAL HEALTH

## (undated) DEVICE — ROSEBUD DISSECTORS: Brand: DEROYAL

## (undated) DEVICE — GLOVE SRG BIOGEL ECLIPSE 7.5

## (undated) DEVICE — CULTURE TUBE AEROBIC

## (undated) DEVICE — DRAIN SPONGES,6 PLY: Brand: EXCILON

## (undated) DEVICE — SUCTION CATH 18 FR

## (undated) DEVICE — CHLORAPREP HI-LITE 26ML ORANGE

## (undated) DEVICE — INTENDED FOR TISSUE SEPARATION, AND OTHER PROCEDURES THAT REQUIRE A SHARP SURGICAL BLADE TO PUNCTURE OR CUT.: Brand: BARD-PARKER SAFETY BLADES SIZE 11, STERILE

## (undated) DEVICE — ABDOMINAL PAD: Brand: DERMACEA

## (undated) DEVICE — 3000CC GUARDIAN II: Brand: GUARDIAN

## (undated) DEVICE — TUBING SUCTION 5MM X 12 FT

## (undated) DEVICE — STRL PENROSE DRAIN 18" X 1/4": Brand: CARDINAL HEALTH

## (undated) DEVICE — PLUMEPEN PRO 10FT

## (undated) DEVICE — CULTURE TUBE ANAEROBIC

## (undated) DEVICE — GLOVE SRG BIOGEL 8

## (undated) DEVICE — GLOVE INDICATOR PI UNDERGLOVE SZ 7.5 BLUE

## (undated) DEVICE — INTENDED FOR TISSUE SEPARATION, AND OTHER PROCEDURES THAT REQUIRE A SHARP SURGICAL BLADE TO PUNCTURE OR CUT.: Brand: BARD-PARKER SAFETY BLADES SIZE 15, STERILE

## (undated) DEVICE — SPONGE LAP 18 X 18 IN

## (undated) DEVICE — NEEDLE 25G X 1 1/2

## (undated) DEVICE — LIGHT HANDLE COVER SLEEVE DISP BLUE STELLAR